# Patient Record
Sex: FEMALE | Race: BLACK OR AFRICAN AMERICAN | NOT HISPANIC OR LATINO | URBAN - METROPOLITAN AREA
[De-identification: names, ages, dates, MRNs, and addresses within clinical notes are randomized per-mention and may not be internally consistent; named-entity substitution may affect disease eponyms.]

---

## 2020-01-10 LAB — AMB EXT COLOGUARD RESULT: NEGATIVE

## 2020-07-02 ENCOUNTER — APPOINTMENT (OUTPATIENT)
Dept: URGENT CARE | Age: 85
End: 2020-07-02

## 2020-07-02 ENCOUNTER — TELEPHONE (OUTPATIENT)
Dept: SCHEDULING | Age: 85
End: 2020-07-02

## 2020-07-30 ENCOUNTER — OFFICE VISIT (OUTPATIENT)
Dept: FAMILY MEDICINE CLINIC | Facility: CLINIC | Age: 85
End: 2020-07-30
Payer: MEDICARE

## 2020-07-30 VITALS
SYSTOLIC BLOOD PRESSURE: 140 MMHG | WEIGHT: 120 LBS | HEIGHT: 65 IN | TEMPERATURE: 97 F | HEART RATE: 62 BPM | DIASTOLIC BLOOD PRESSURE: 71 MMHG | BODY MASS INDEX: 19.99 KG/M2

## 2020-07-30 DIAGNOSIS — E78.00 HYPERCHOLESTEREMIA: ICD-10-CM

## 2020-07-30 DIAGNOSIS — I10 ESSENTIAL HYPERTENSION: Primary | ICD-10-CM

## 2020-07-30 DIAGNOSIS — D64.9 ANEMIA, UNSPECIFIED TYPE: ICD-10-CM

## 2020-07-30 DIAGNOSIS — I73.9 PVD (PERIPHERAL VASCULAR DISEASE) (HCC): ICD-10-CM

## 2020-07-30 DIAGNOSIS — C69.91 MALIGNANT NEOPLASM OF RIGHT EYE (HCC): ICD-10-CM

## 2020-07-30 PROCEDURE — 3078F DIAST BP <80 MM HG: CPT | Performed by: FAMILY MEDICINE

## 2020-07-30 PROCEDURE — 3077F SYST BP >= 140 MM HG: CPT | Performed by: FAMILY MEDICINE

## 2020-07-30 PROCEDURE — 99203 OFFICE O/P NEW LOW 30 MIN: CPT | Performed by: FAMILY MEDICINE

## 2020-07-30 PROCEDURE — 3008F BODY MASS INDEX DOCD: CPT | Performed by: FAMILY MEDICINE

## 2020-07-30 RX ORDER — ROSUVASTATIN CALCIUM 10 MG/1
10 TABLET, COATED ORAL NIGHTLY
Qty: 90 TABLET | Refills: 3 | Status: SHIPPED | OUTPATIENT
Start: 2020-07-30 | End: 2020-11-12

## 2020-07-30 RX ORDER — ROSUVASTATIN CALCIUM 10 MG/1
10 TABLET, COATED ORAL NIGHTLY
COMMUNITY
Start: 2020-07-23 | End: 2020-07-30

## 2020-07-30 RX ORDER — ASPIRIN 81 MG/1
81 TABLET ORAL DAILY
Qty: 90 TABLET | Refills: 3 | Status: SHIPPED | OUTPATIENT
Start: 2020-07-30 | End: 2021-10-28

## 2020-07-30 RX ORDER — ASPIRIN 81 MG/1
1 TABLET ORAL
COMMUNITY
End: 2020-07-30

## 2020-07-30 RX ORDER — CLOPIDOGREL BISULFATE 75 MG/1
75 TABLET ORAL DAILY
Qty: 90 TABLET | Refills: 3 | Status: SHIPPED | OUTPATIENT
Start: 2020-07-30 | End: 2021-07-30

## 2020-07-30 RX ORDER — CARVEDILOL 6.25 MG/1
TABLET ORAL 2 TIMES DAILY
COMMUNITY
Start: 2020-07-23 | End: 2020-07-30

## 2020-07-30 RX ORDER — CLOPIDOGREL BISULFATE 75 MG/1
75 TABLET ORAL DAILY
COMMUNITY
Start: 2020-05-11 | End: 2020-07-30

## 2020-07-30 RX ORDER — IRBESARTAN 300 MG/1
300 TABLET ORAL DAILY
Qty: 90 TABLET | Refills: 3 | Status: SHIPPED | OUTPATIENT
Start: 2020-07-30 | End: 2021-07-30

## 2020-07-30 RX ORDER — HYDRALAZINE HYDROCHLORIDE 100 MG/1
TABLET, FILM COATED ORAL
COMMUNITY
Start: 2020-05-09 | End: 2020-07-30

## 2020-07-30 RX ORDER — IRBESARTAN 300 MG/1
300 TABLET ORAL DAILY
COMMUNITY
Start: 2020-05-04 | End: 2020-07-30

## 2020-07-30 RX ORDER — HYDROCHLOROTHIAZIDE 12.5 MG/1
12.5 TABLET ORAL DAILY
Qty: 90 TABLET | Refills: 3 | Status: SHIPPED | OUTPATIENT
Start: 2020-07-30 | End: 2021-07-30

## 2020-07-30 RX ORDER — HYDRALAZINE HYDROCHLORIDE 100 MG/1
TABLET, FILM COATED ORAL
Qty: 180 TABLET | Refills: 3 | Status: SHIPPED | OUTPATIENT
Start: 2020-07-30 | End: 2021-07-30

## 2020-07-30 RX ORDER — CARVEDILOL 6.25 MG/1
6.25 TABLET ORAL 2 TIMES DAILY
Qty: 180 TABLET | Refills: 3 | Status: SHIPPED | OUTPATIENT
Start: 2020-07-30 | End: 2021-04-29 | Stop reason: DRUGHIGH

## 2020-07-30 RX ORDER — FIBER
TABLET ORAL
COMMUNITY

## 2020-07-30 NOTE — PROGRESS NOTES
Rob Roper is a 80year old female. Patient presents with:  Establish Care  Medication Request      HPI:   Patient presents as an 63-year-old female complaining of bilateral leg swelling.   Patient has a past medical history of hypertension and periphe sweats, fatigue  VISION: No recent vision problems, blurry vision or double vision  HEENT: No decreased hearing ear pain nasal congestion or sore throat  SKIN: denies any unusual skin lesions or rashes  RESPIRATORY: denies shortness of breath, cough, wheez

## 2020-08-06 ENCOUNTER — OFFICE VISIT (OUTPATIENT)
Dept: FAMILY MEDICINE CLINIC | Facility: CLINIC | Age: 85
End: 2020-08-06
Payer: MEDICARE

## 2020-08-06 VITALS
TEMPERATURE: 97 F | HEIGHT: 65 IN | SYSTOLIC BLOOD PRESSURE: 130 MMHG | BODY MASS INDEX: 19.79 KG/M2 | DIASTOLIC BLOOD PRESSURE: 65 MMHG | WEIGHT: 118.81 LBS | HEART RATE: 62 BPM

## 2020-08-06 DIAGNOSIS — R60.0 BILATERAL LEG EDEMA: ICD-10-CM

## 2020-08-06 DIAGNOSIS — I10 ESSENTIAL HYPERTENSION: Primary | ICD-10-CM

## 2020-08-06 PROCEDURE — 3078F DIAST BP <80 MM HG: CPT | Performed by: FAMILY MEDICINE

## 2020-08-06 PROCEDURE — 99213 OFFICE O/P EST LOW 20 MIN: CPT | Performed by: FAMILY MEDICINE

## 2020-08-06 PROCEDURE — 3075F SYST BP GE 130 - 139MM HG: CPT | Performed by: FAMILY MEDICINE

## 2020-08-06 PROCEDURE — 3008F BODY MASS INDEX DOCD: CPT | Performed by: FAMILY MEDICINE

## 2020-08-06 NOTE — PROGRESS NOTES
Juana Corcoran is a 80year old female. Patient presents with:  Edema: f/u on swelling of bilateral ankles      HPI:   Pt presents for follow up taking HCTZ 12.5 mg every other day. Leg swelling is greatly improved.  Itching also improved    Current Outpa denies heartburn, nausea or vomiting  : No Pain on urination, change in the color of urine, discharge, urinating frequently  MUS: No back pain, joint pain, muscle pain  NEURO: denies headaches , anxiety, depression    EXAM:   /65   Pulse 62   Temp

## 2020-09-02 ENCOUNTER — OFFICE VISIT (OUTPATIENT)
Dept: FAMILY MEDICINE CLINIC | Facility: CLINIC | Age: 85
End: 2020-09-02
Payer: MEDICARE

## 2020-09-02 VITALS
WEIGHT: 116.63 LBS | SYSTOLIC BLOOD PRESSURE: 128 MMHG | BODY MASS INDEX: 19.43 KG/M2 | HEIGHT: 65 IN | TEMPERATURE: 98 F | HEART RATE: 63 BPM | DIASTOLIC BLOOD PRESSURE: 71 MMHG

## 2020-09-02 DIAGNOSIS — R41.3 MEMORY LOSS: ICD-10-CM

## 2020-09-02 DIAGNOSIS — I10 ESSENTIAL HYPERTENSION: Primary | ICD-10-CM

## 2020-09-02 PROCEDURE — 3074F SYST BP LT 130 MM HG: CPT | Performed by: FAMILY MEDICINE

## 2020-09-02 PROCEDURE — 99213 OFFICE O/P EST LOW 20 MIN: CPT | Performed by: FAMILY MEDICINE

## 2020-09-02 PROCEDURE — 3008F BODY MASS INDEX DOCD: CPT | Performed by: FAMILY MEDICINE

## 2020-09-02 PROCEDURE — 3078F DIAST BP <80 MM HG: CPT | Performed by: FAMILY MEDICINE

## 2020-09-02 NOTE — PROGRESS NOTES
HPI:  Alexy Odell is a 80year old female who presents for establishment of care. Used to see Dr. Lyn Sanders. Daughter is Mic Hodges. Saw Dr. Munira Mustafa for prescription refill. Had restarted diuretic due to ankle edema. Taking it every other day.      Blind in 3  hydrochlorothiazide 12.5 MG Oral Tab, Take 1 tablet (12.5 mg total) by mouth daily. (Patient taking differently: Take 12.5 mg by mouth every other day.  ), Disp: 90 tablet, Rfl: 3    No current facility-administered medications on file prior to visit.

## 2020-10-09 ENCOUNTER — LAB ENCOUNTER (OUTPATIENT)
Dept: LAB | Facility: HOSPITAL | Age: 85
End: 2020-10-09
Attending: Other
Payer: MEDICARE

## 2020-10-09 DIAGNOSIS — G30.1 LATE ONSET ALZHEIMER'S DISEASE WITHOUT BEHAVIORAL DISTURBANCE (HCC): ICD-10-CM

## 2020-10-09 DIAGNOSIS — F02.80 LATE ONSET ALZHEIMER'S DISEASE WITHOUT BEHAVIORAL DISTURBANCE (HCC): ICD-10-CM

## 2020-10-09 PROCEDURE — 36415 COLL VENOUS BLD VENIPUNCTURE: CPT

## 2020-10-09 PROCEDURE — 86780 TREPONEMA PALLIDUM: CPT

## 2020-10-09 PROCEDURE — 84443 ASSAY THYROID STIM HORMONE: CPT

## 2020-10-09 PROCEDURE — 82607 VITAMIN B-12: CPT

## 2020-10-09 PROCEDURE — 86592 SYPHILIS TEST NON-TREP QUAL: CPT

## 2020-10-09 PROCEDURE — 86593 SYPHILIS TEST NON-TREP QUANT: CPT

## 2020-10-09 NOTE — PROGRESS NOTES
Neurology Initial Visit     Referred By: Dr. Jose Duarte    Chief Complaint: Patient presents with:  Memory Loss: New patient referred by Dr Jose Duarte since Covid pandemic started.  Accompanied by daughter  who states she will become confused, repeats same Rashid An mg total) by mouth daily. , Disp: 90 tablet, Rfl: 3  •  carvedilol 6.25 MG Oral Tab, Take 1 tablet (6.25 mg total) by mouth 2 (two) times daily. , Disp: 180 tablet, Rfl: 3  •  Clopidogrel Bisulfate 75 MG Oral Tab, Take 1 tablet (75 mg total) by mouth daily. , distally    Sensory Exam:  Light touch sensation- intact in all 4 extremities    Deep Tendon Reflexes:  Biceps 2+ bilateral symmetric  Triceps 2+ bilateral symmetric  Brachioradialis 2 + bilateral symmetric  Patellar 2+ bilateral symmetric  Ankle jerk 2+ b

## 2020-10-14 ENCOUNTER — MED REC SCAN ONLY (OUTPATIENT)
Dept: NEUROLOGY | Facility: CLINIC | Age: 85
End: 2020-10-14

## 2020-10-19 ENCOUNTER — TELEPHONE (OUTPATIENT)
Dept: NEUROLOGY | Facility: CLINIC | Age: 85
End: 2020-10-19

## 2020-10-19 NOTE — TELEPHONE ENCOUNTER
Positive RPR result. Called patient`s cell phone and daughter answered. Spoke to patient`s daughter Thomas Miller verified. Advised RPR was positive and should follow up with infectious disease for evaluation. Daughter expressed understanding.      Dana

## 2020-10-19 NOTE — TELEPHONE ENCOUNTER
----- Message from Humberto Santos MD sent at 10/19/2020  8:18 AM CDT -----  Forward her to the infectious diseases for further evaluation    Thank you

## 2020-12-02 ENCOUNTER — OFFICE VISIT (OUTPATIENT)
Dept: FAMILY MEDICINE CLINIC | Facility: CLINIC | Age: 85
End: 2020-12-02
Payer: MEDICARE

## 2020-12-02 VITALS
BODY MASS INDEX: 18.99 KG/M2 | DIASTOLIC BLOOD PRESSURE: 79 MMHG | TEMPERATURE: 97 F | HEART RATE: 65 BPM | HEIGHT: 65 IN | WEIGHT: 114 LBS | SYSTOLIC BLOOD PRESSURE: 167 MMHG | RESPIRATION RATE: 18 BRPM

## 2020-12-02 DIAGNOSIS — E78.00 HYPERCHOLESTEREMIA: ICD-10-CM

## 2020-12-02 DIAGNOSIS — G30.9 ALZHEIMER'S DEMENTIA WITHOUT BEHAVIORAL DISTURBANCE, UNSPECIFIED TIMING OF DEMENTIA ONSET (HCC): ICD-10-CM

## 2020-12-02 DIAGNOSIS — C69.91 MALIGNANT NEOPLASM OF RIGHT EYE (HCC): ICD-10-CM

## 2020-12-02 DIAGNOSIS — F02.80 ALZHEIMER'S DEMENTIA WITHOUT BEHAVIORAL DISTURBANCE, UNSPECIFIED TIMING OF DEMENTIA ONSET (HCC): ICD-10-CM

## 2020-12-02 DIAGNOSIS — I10 ESSENTIAL HYPERTENSION: ICD-10-CM

## 2020-12-02 DIAGNOSIS — J34.89 RHINORRHEA: Primary | ICD-10-CM

## 2020-12-02 DIAGNOSIS — Z00.00 ENCOUNTER FOR ANNUAL PHYSICAL EXAM: ICD-10-CM

## 2020-12-02 DIAGNOSIS — Z00.00 ENCOUNTER FOR ANNUAL HEALTH EXAMINATION: ICD-10-CM

## 2020-12-02 PROCEDURE — 3077F SYST BP >= 140 MM HG: CPT | Performed by: FAMILY MEDICINE

## 2020-12-02 PROCEDURE — 90662 IIV NO PRSV INCREASED AG IM: CPT | Performed by: FAMILY MEDICINE

## 2020-12-02 PROCEDURE — 96160 PT-FOCUSED HLTH RISK ASSMT: CPT | Performed by: FAMILY MEDICINE

## 2020-12-02 PROCEDURE — 99397 PER PM REEVAL EST PAT 65+ YR: CPT | Performed by: FAMILY MEDICINE

## 2020-12-02 PROCEDURE — G0008 ADMIN INFLUENZA VIRUS VAC: HCPCS | Performed by: FAMILY MEDICINE

## 2020-12-02 PROCEDURE — 3008F BODY MASS INDEX DOCD: CPT | Performed by: FAMILY MEDICINE

## 2020-12-02 PROCEDURE — 3078F DIAST BP <80 MM HG: CPT | Performed by: FAMILY MEDICINE

## 2020-12-02 PROCEDURE — G0439 PPPS, SUBSEQ VISIT: HCPCS | Performed by: FAMILY MEDICINE

## 2020-12-02 PROCEDURE — 36415 COLL VENOUS BLD VENIPUNCTURE: CPT | Performed by: FAMILY MEDICINE

## 2020-12-02 RX ORDER — ROSUVASTATIN CALCIUM 10 MG/1
1 TABLET, COATED ORAL
COMMUNITY
End: 2021-12-27

## 2020-12-02 RX ORDER — DIAPER,BRIEF,INFANT-TODD,DISP
EACH MISCELLANEOUS
COMMUNITY
Start: 2020-11-23 | End: 2021-11-17

## 2020-12-02 RX ORDER — LORATADINE 10 MG/1
10 TABLET ORAL DAILY
Qty: 90 TABLET | Refills: 1 | Status: SHIPPED | OUTPATIENT
Start: 2020-12-02 | End: 2021-06-01

## 2020-12-02 NOTE — PROGRESS NOTES
HPI:   Rob Roper is a 80year old female who presents for a Medicare Subsequent Annual Wellness visit (Pt already had Initial Annual Wellness). 80 yr old female who present for Medicare physical. Lives with daughter.   Has trouble performing Scorin       Cognitive Assessment     What day of the week is this?: Correct  What month is it?: Correct  What year is it?: Correct  Recall \"Ball\": Correct  Recall \"Flag\": Correct  Recall \"Tree\": Correct       Functional Ability/Status   Deloria Barwick Family/surrogate (if present), and forms available to patient in AVS     She does NOT have a Power of  for [employer] on file in [place].    Advance care planning including the explanation and discussion of advance directives standard forms performed Vitamins-Minerals (MULTI-DAY PLUS MINERALS) Oral Tab, Take by mouth. •  aspirin (ASPIRIN 81) 81 MG Oral Tab EC, Take 1 tablet (81 mg total) by mouth daily.  (Patient taking differently: Take 81 mg by mouth every other day.  )    •  carvedilol 6.25 MG Ora and rash  Musculoskeletal:  Negative for bone/joint symptoms  Neurological:  Positive for mental status changes- chronic  Psychiatric:  Negative for inappropriate interaction and psychiatric symptoms      EXAM:   BP (!) 167/79   Pulse 65   Temp 96.8 °F (36 presents for a Medicare Assessment. PLAN SUMMARY:     Encounter for annual physical exam  -     CBC, PLATELET; NO DIFFERENTIAL; Future  -     COMP METABOLIC PANEL (14); Future  -     LIPID PANEL; Future  -Flu shot given.      Rhinorrhea  -     ENT - INT necessary Electrocardiogram date       Colorectal Cancer Screening      Colonoscopy Screen every 10 years There are no preventive care reminders to display for this patient.  Update Health Maintenance if applicable    Flex Sigmoidoscopy Screen every 10 year Part B No vaccine history found This may be covered with your prescription benefits, but Medicare does not cover unless Medically needed    Zoster  Not covered by Medicare Part B No vaccine history found This may be covered with your pharmacy  prescription

## 2020-12-11 ENCOUNTER — OFFICE VISIT (OUTPATIENT)
Dept: OTOLARYNGOLOGY | Facility: CLINIC | Age: 85
End: 2020-12-11
Payer: MEDICARE

## 2020-12-11 VITALS
HEIGHT: 67 IN | TEMPERATURE: 97 F | WEIGHT: 114 LBS | BODY MASS INDEX: 17.89 KG/M2 | DIASTOLIC BLOOD PRESSURE: 68 MMHG | SYSTOLIC BLOOD PRESSURE: 124 MMHG

## 2020-12-11 DIAGNOSIS — R09.82 POSTNASAL DISCHARGE: Primary | ICD-10-CM

## 2020-12-11 PROCEDURE — 3008F BODY MASS INDEX DOCD: CPT | Performed by: OTOLARYNGOLOGY

## 2020-12-11 PROCEDURE — 3078F DIAST BP <80 MM HG: CPT | Performed by: OTOLARYNGOLOGY

## 2020-12-11 PROCEDURE — 99203 OFFICE O/P NEW LOW 30 MIN: CPT | Performed by: OTOLARYNGOLOGY

## 2020-12-11 PROCEDURE — 3074F SYST BP LT 130 MM HG: CPT | Performed by: OTOLARYNGOLOGY

## 2020-12-11 RX ORDER — MONTELUKAST SODIUM 10 MG/1
10 TABLET ORAL NIGHTLY
Qty: 30 TABLET | Refills: 3 | Status: SHIPPED | OUTPATIENT
Start: 2020-12-11 | End: 2021-03-02

## 2020-12-11 RX ORDER — FLUTICASONE PROPIONATE 50 MCG
1 SPRAY, SUSPENSION (ML) NASAL 2 TIMES DAILY
Qty: 1 BOTTLE | Refills: 3 | Status: SHIPPED | OUTPATIENT
Start: 2020-12-11 | End: 2021-03-02

## 2020-12-11 NOTE — PROGRESS NOTES
Osmani Cronin is a 80year old female.   Patient presents with:  Nose Bleed: Patient Presents with constant runny nose for a long while       HISTORY OF PRESENT ILLNESS    She presents with [place] Redo 1 to 2-year history of runny nose with postnasal discharge an bleeding and easy bruising.            PHYSICAL EXAM    /68 (BP Location: Left arm, Patient Position: Sitting, Cuff Size: adult)   Temp 97 °F (36.1 °C) (Tympanic)   Ht 5' 7\" (1.702 m)   Wt 114 lb (51.7 kg)   BMI 17.85 kg/m²        Constitutional Norm •  loratadine (CLARITIN) 10 MG Oral Tab, Take 1 tablet (10 mg total) by mouth daily. , Disp: 90 tablet, Rfl: 1  •  Donepezil HCl 5 MG Oral Tab, Take 1 tablet (5 mg total) by mouth nightly., Disp: 90 tablet, Rfl: 3  •  Multiple Vitamins-Minerals (MULTI-DAY

## 2020-12-13 PROBLEM — R60.0 BILATERAL LEG EDEMA: Status: RESOLVED | Noted: 2020-08-06 | Resolved: 2020-12-13

## 2021-01-15 ENCOUNTER — OFFICE VISIT (OUTPATIENT)
Dept: OTOLARYNGOLOGY | Facility: CLINIC | Age: 86
End: 2021-01-15
Payer: MEDICARE

## 2021-01-15 VITALS
BODY MASS INDEX: 18 KG/M2 | TEMPERATURE: 96 F | SYSTOLIC BLOOD PRESSURE: 178 MMHG | DIASTOLIC BLOOD PRESSURE: 82 MMHG | HEART RATE: 65 BPM | WEIGHT: 114 LBS

## 2021-01-15 DIAGNOSIS — R09.82 POSTNASAL DISCHARGE: Primary | ICD-10-CM

## 2021-01-15 PROCEDURE — 3079F DIAST BP 80-89 MM HG: CPT | Performed by: OTOLARYNGOLOGY

## 2021-01-15 PROCEDURE — 3077F SYST BP >= 140 MM HG: CPT | Performed by: OTOLARYNGOLOGY

## 2021-01-15 PROCEDURE — 99213 OFFICE O/P EST LOW 20 MIN: CPT | Performed by: OTOLARYNGOLOGY

## 2021-01-15 RX ORDER — GLYCOPYRROLATE 1 MG/1
1 TABLET ORAL 2 TIMES DAILY
Qty: 60 TABLET | Refills: 1 | Status: SHIPPED | OUTPATIENT
Start: 2021-01-15 | End: 2021-04-02

## 2021-01-15 NOTE — PROGRESS NOTES
Floyd Lindsay is a 80year old female. Patient presents with: Follow - Up: One month follow up. Patient continues to have runny nose despite try flonase and singulair.        HISTORY OF PRESENT ILLNESS  She presents with Sandra Anderson 1 to 2-year history of runny Blurred vision and vision changes. Respiratory Negative Dyspnea and wheezing. Cardio Negative Chest pain, irregular heartbeat/palpitations and syncope. GI Negative Abdominal pain and diarrhea.    Endocrine Negative Cold intolerance and heat intoleranc (two) times daily. , Disp: 60 tablet, Rfl: 1  •  Fluticasone Propionate 50 MCG/ACT Nasal Suspension, 1 spray by Nasal route 2 (two) times daily. , Disp: 1 Bottle, Rfl: 3  •  Bacitracin Zinc 500 UNIT/GM External Ointment, APPBID TO LEGS FOR 1 MONTH, Disp: , R voice recognition dictation software. As a result errors may occur. When identified these errors have been corrected. While every attempt is made to correct errors during dictation discrepancies may still exist    Dread Quinones MD    1/15/2021    1:08 PM

## 2021-02-01 DIAGNOSIS — Z23 NEED FOR VACCINATION: ICD-10-CM

## 2021-02-19 ENCOUNTER — OFFICE VISIT (OUTPATIENT)
Dept: OTOLARYNGOLOGY | Facility: CLINIC | Age: 86
End: 2021-02-19
Payer: MEDICARE

## 2021-02-19 VITALS
BODY MASS INDEX: 17.89 KG/M2 | WEIGHT: 114 LBS | SYSTOLIC BLOOD PRESSURE: 131 MMHG | HEIGHT: 67 IN | DIASTOLIC BLOOD PRESSURE: 68 MMHG | TEMPERATURE: 97 F

## 2021-02-19 DIAGNOSIS — R09.82 POSTNASAL DISCHARGE: Primary | ICD-10-CM

## 2021-02-19 PROCEDURE — 3075F SYST BP GE 130 - 139MM HG: CPT | Performed by: OTOLARYNGOLOGY

## 2021-02-19 PROCEDURE — 3078F DIAST BP <80 MM HG: CPT | Performed by: OTOLARYNGOLOGY

## 2021-02-19 PROCEDURE — 99213 OFFICE O/P EST LOW 20 MIN: CPT | Performed by: OTOLARYNGOLOGY

## 2021-02-19 PROCEDURE — 3008F BODY MASS INDEX DOCD: CPT | Performed by: OTOLARYNGOLOGY

## 2021-02-19 NOTE — PROGRESS NOTES
Alexandru Vázquez is a 80year old female. Patient presents with:   Follow - Up: Patient here for 1 month f/u regarding Postnasal discharge, per pt pist nasal discharge is better       HISTORY OF PRESENT ILLNESS  She presents with Brisa Lo 1 to 2-year history of eye   • Cancer of eye [facility])    • Essential hypertension    • Hyperlipidemia        Past Surgical History:   Procedure Laterality Date   • CATARACT Left    • EYE SURGERY     • HYSTERECTOMY     • OTHER SURGICAL HISTORY  1960    partial thyroidectomy   • THYR Lymph Detail Normal Submental. Submandibular. Anterior cervical. Posterior cervical. Supraclavicular.         Nose/Mouth/Throat Normal External nose - Normal. Lips/teeth/gums - Normal. Tonsils - Normal. Oropharynx - Normal.   Nose/Mouth/Throat Normal mouth daily. (Patient taking differently: Take 12.5 mg by mouth every other day.  ), Disp: 90 tablet, Rfl: 3  ASSESSMENT AND PLAN    1. Postnasal discharge  Doing very well on Singulair loratadine fluticasone and glycopyrrolate 3 times daily.   Using the sp

## 2021-03-02 RX ORDER — FLUTICASONE PROPIONATE 50 MCG
SPRAY, SUSPENSION (ML) NASAL
Qty: 16 G | Refills: 2 | Status: SHIPPED | OUTPATIENT
Start: 2021-03-02 | End: 2021-11-17

## 2021-03-02 RX ORDER — MONTELUKAST SODIUM 10 MG/1
TABLET ORAL
Qty: 90 TABLET | Refills: 0 | Status: SHIPPED | OUTPATIENT
Start: 2021-03-02 | End: 2021-03-29

## 2021-03-06 ENCOUNTER — IMMUNIZATION (OUTPATIENT)
Dept: LAB | Facility: HOSPITAL | Age: 86
End: 2021-03-06
Attending: HOSPITALIST
Payer: MEDICARE

## 2021-03-06 DIAGNOSIS — Z23 NEED FOR VACCINATION: Primary | ICD-10-CM

## 2021-03-06 PROCEDURE — 0011A SARSCOV2 VAC 100MCG/0.5ML IM: CPT

## 2021-03-29 RX ORDER — MONTELUKAST SODIUM 10 MG/1
TABLET ORAL
Qty: 90 TABLET | Refills: 0 | Status: SHIPPED | OUTPATIENT
Start: 2021-03-29 | End: 2021-06-15

## 2021-04-02 RX ORDER — GLYCOPYRROLATE 1 MG/1
TABLET ORAL
Qty: 60 TABLET | Refills: 1 | Status: SHIPPED | OUTPATIENT
Start: 2021-04-02 | End: 2021-11-17

## 2021-04-03 ENCOUNTER — IMMUNIZATION (OUTPATIENT)
Dept: LAB | Facility: HOSPITAL | Age: 86
End: 2021-04-03
Attending: EMERGENCY MEDICINE
Payer: MEDICARE

## 2021-04-03 DIAGNOSIS — Z23 NEED FOR VACCINATION: Primary | ICD-10-CM

## 2021-04-03 PROCEDURE — 0012A SARSCOV2 VAC 100MCG/0.5ML IM: CPT

## 2021-04-26 ENCOUNTER — OFFICE VISIT (OUTPATIENT)
Dept: OTOLARYNGOLOGY | Facility: CLINIC | Age: 86
End: 2021-04-26
Payer: MEDICARE

## 2021-04-26 ENCOUNTER — OFFICE VISIT (OUTPATIENT)
Dept: FAMILY MEDICINE CLINIC | Facility: CLINIC | Age: 86
End: 2021-04-26
Payer: MEDICARE

## 2021-04-26 VITALS
TEMPERATURE: 97 F | SYSTOLIC BLOOD PRESSURE: 220 MMHG | HEIGHT: 67 IN | BODY MASS INDEX: 17.89 KG/M2 | DIASTOLIC BLOOD PRESSURE: 88 MMHG | WEIGHT: 114 LBS

## 2021-04-26 DIAGNOSIS — I10 UNCONTROLLED HYPERTENSION: Primary | ICD-10-CM

## 2021-04-26 DIAGNOSIS — R09.82 POSTNASAL DISCHARGE: Primary | ICD-10-CM

## 2021-04-26 PROCEDURE — 3008F BODY MASS INDEX DOCD: CPT | Performed by: FAMILY MEDICINE

## 2021-04-26 PROCEDURE — 99214 OFFICE O/P EST MOD 30 MIN: CPT | Performed by: FAMILY MEDICINE

## 2021-04-26 PROCEDURE — 3077F SYST BP >= 140 MM HG: CPT | Performed by: FAMILY MEDICINE

## 2021-04-26 PROCEDURE — 3079F DIAST BP 80-89 MM HG: CPT | Performed by: OTOLARYNGOLOGY

## 2021-04-26 PROCEDURE — 3008F BODY MASS INDEX DOCD: CPT | Performed by: OTOLARYNGOLOGY

## 2021-04-26 PROCEDURE — 3079F DIAST BP 80-89 MM HG: CPT | Performed by: FAMILY MEDICINE

## 2021-04-26 PROCEDURE — 93000 ELECTROCARDIOGRAM COMPLETE: CPT | Performed by: FAMILY MEDICINE

## 2021-04-26 PROCEDURE — 3077F SYST BP >= 140 MM HG: CPT | Performed by: OTOLARYNGOLOGY

## 2021-04-26 PROCEDURE — 99213 OFFICE O/P EST LOW 20 MIN: CPT | Performed by: OTOLARYNGOLOGY

## 2021-04-26 NOTE — PROGRESS NOTES
Erlinda Sánchez is a 80year old female. Patient presents with:   Follow - Up: Patient here for a follow up regaridng postnasal discharge, per pt feeling better with medications       HISTORY OF PRESENT ILLNESS  She presents with Ronal Cavanaugh 1 to 2-year history of Tobacco Use      Smoking status: Never Smoker      Smokeless tobacco: Never Used    Vaping Use      Vaping Use: Never used    Substance and Sexual Activity      Alcohol use: Never      Drug use: Never      History reviewed. No pertinent family history. features - Normal. Eyebrows - Normal. Skull - Normal.        Nasopharynx Normal External nose - Normal. Lips/teeth/gums - Normal. Tonsils - Normal. Oropharynx - Normal.   Ears Normal Inspection - Right: Normal, Left: Normal. Canal - Right: Normal, Left: No Rfl: 3  •  hydrALAZINE HCl 100 MG Oral Tab, TAKE ONE T PO BID WITH FOOD, Disp: 180 tablet, Rfl: 3  •  Irbesartan 300 MG Oral Tab, Take 1 tablet (300 mg total) by mouth daily. , Disp: 90 tablet, Rfl: 3  •  hydrochlorothiazide 12.5 MG Oral Tab, Take 1 tablet

## 2021-04-27 VITALS
TEMPERATURE: 97 F | HEART RATE: 69 BPM | SYSTOLIC BLOOD PRESSURE: 188 MMHG | DIASTOLIC BLOOD PRESSURE: 84 MMHG | HEIGHT: 67 IN | WEIGHT: 114 LBS | BODY MASS INDEX: 17.89 KG/M2

## 2021-04-27 NOTE — PROGRESS NOTES
HPI:    Betty Sagsatume is a 80year old female presents to clinic with daughter for follow-up regarding hypertension. Was seen by Dr. Rolo Stahl today-blood pressure was elevated. Reports compliance with medications. Daughter gives her her meds daily.   Do (Patient taking differently: Take 81 mg by mouth every other day.  ) 90 tablet 3   • carvedilol 6.25 MG Oral Tab Take 1 tablet (6.25 mg total) by mouth 2 (two) times daily.  180 tablet 3   • Clopidogrel Bisulfate 75 MG Oral Tab Take 1 tablet (75 mg total) b aware that if she experiences any chest pain, shortness of breath, headaches, dizziness, to proceed to the ER. Responsible party/patient verbalized understanding of information discussed. No barriers to learning observed.           Orders This Visit:

## 2021-04-29 ENCOUNTER — LAB ENCOUNTER (OUTPATIENT)
Dept: LAB | Age: 86
End: 2021-04-29
Attending: FAMILY MEDICINE
Payer: MEDICARE

## 2021-04-29 ENCOUNTER — OFFICE VISIT (OUTPATIENT)
Dept: FAMILY MEDICINE CLINIC | Facility: CLINIC | Age: 86
End: 2021-04-29
Payer: MEDICARE

## 2021-04-29 VITALS
DIASTOLIC BLOOD PRESSURE: 65 MMHG | HEART RATE: 69 BPM | WEIGHT: 120 LBS | SYSTOLIC BLOOD PRESSURE: 220 MMHG | BODY MASS INDEX: 19 KG/M2 | RESPIRATION RATE: 18 BRPM | TEMPERATURE: 96 F

## 2021-04-29 DIAGNOSIS — N18.32 STAGE 3B CHRONIC KIDNEY DISEASE (HCC): Primary | ICD-10-CM

## 2021-04-29 DIAGNOSIS — I10 ESSENTIAL HYPERTENSION: ICD-10-CM

## 2021-04-29 DIAGNOSIS — N18.32 STAGE 3B CHRONIC KIDNEY DISEASE (HCC): ICD-10-CM

## 2021-04-29 PROCEDURE — 99214 OFFICE O/P EST MOD 30 MIN: CPT | Performed by: FAMILY MEDICINE

## 2021-04-29 PROCEDURE — 3078F DIAST BP <80 MM HG: CPT | Performed by: FAMILY MEDICINE

## 2021-04-29 PROCEDURE — 80053 COMPREHEN METABOLIC PANEL: CPT

## 2021-04-29 PROCEDURE — 3077F SYST BP >= 140 MM HG: CPT | Performed by: FAMILY MEDICINE

## 2021-04-29 PROCEDURE — 85027 COMPLETE CBC AUTOMATED: CPT

## 2021-04-29 PROCEDURE — 36415 COLL VENOUS BLD VENIPUNCTURE: CPT

## 2021-04-29 RX ORDER — CARVEDILOL 12.5 MG/1
12.5 TABLET ORAL 2 TIMES DAILY WITH MEALS
Qty: 180 TABLET | Refills: 1 | Status: SHIPPED | OUTPATIENT
Start: 2021-04-29 | End: 2021-10-20

## 2021-04-29 NOTE — PROGRESS NOTES
HPI: Sheryle Mylar is a 80year old female who presents for HTN follow-up. BP has been very high. Home blood pressures have been 160s-170s/60s. Nervous because she is here.  Saw Dr. Cheri Garcia on Tuesday who did EKG which was normal and then increased Carvedilol to Oral Tab, Take 1 tablet (300 mg total) by mouth daily. , Disp: 90 tablet, Rfl: 3  hydrochlorothiazide 12.5 MG Oral Tab, Take 1 tablet (12.5 mg total) by mouth daily.  (Patient taking differently: Take 12.5 mg by mouth every other day.  ), Disp: 90 tablet, Rf

## 2021-06-01 RX ORDER — LORATADINE 10 MG/1
TABLET ORAL
Qty: 90 TABLET | Refills: 1 | Status: SHIPPED | OUTPATIENT
Start: 2021-06-01 | End: 2021-11-29

## 2021-06-05 ENCOUNTER — OFFICE VISIT (OUTPATIENT)
Dept: FAMILY MEDICINE CLINIC | Facility: CLINIC | Age: 86
End: 2021-06-05
Payer: MEDICARE

## 2021-06-05 VITALS
SYSTOLIC BLOOD PRESSURE: 126 MMHG | HEIGHT: 67 IN | TEMPERATURE: 98 F | BODY MASS INDEX: 19 KG/M2 | RESPIRATION RATE: 16 BRPM | HEART RATE: 59 BPM | DIASTOLIC BLOOD PRESSURE: 61 MMHG

## 2021-06-05 DIAGNOSIS — I10 ESSENTIAL HYPERTENSION: Primary | ICD-10-CM

## 2021-06-05 DIAGNOSIS — R40.4 ALTERED LEVEL OF CONSCIOUSNESS: ICD-10-CM

## 2021-06-05 PROCEDURE — 99214 OFFICE O/P EST MOD 30 MIN: CPT | Performed by: FAMILY MEDICINE

## 2021-06-05 PROCEDURE — 3078F DIAST BP <80 MM HG: CPT | Performed by: FAMILY MEDICINE

## 2021-06-05 PROCEDURE — 3074F SYST BP LT 130 MM HG: CPT | Performed by: FAMILY MEDICINE

## 2021-06-05 NOTE — PROGRESS NOTES
HPI:    Siddharth Candelario is a 80year old female presents to clinic for ER follow up. Patient was unresponsive at home on 6/3/21. Patient's daughter saw her breathing, but she is not responding to loud voices.   Took her to the [facility] work and head CTs we every other day.  ) 90 tablet 3   • Clopidogrel Bisulfate 75 MG Oral Tab Take 1 tablet (75 mg total) by mouth daily.  90 tablet 3   • hydrALAZINE HCl 100 MG Oral Tab TAKE ONE T PO BID WITH FOOD 180 tablet 3   • Irbesartan 300 MG Oral Tab Take 1 tablet (300 encounter. Meds This Visit:  Requested Prescriptions      No prescriptions requested or ordered in this encounter       Imaging & Referrals:  None       This note was created by [employer] voice recognition.  Errors in content may be related to improper re

## 2021-06-15 RX ORDER — MONTELUKAST SODIUM 10 MG/1
TABLET ORAL
Qty: 90 TABLET | Refills: 0 | Status: SHIPPED | OUTPATIENT
Start: 2021-06-15 | End: 2021-08-30

## 2021-07-30 RX ORDER — HYDRALAZINE HYDROCHLORIDE 100 MG/1
TABLET, FILM COATED ORAL
Qty: 180 TABLET | Refills: 3 | Status: SHIPPED | OUTPATIENT
Start: 2021-07-30

## 2021-07-30 RX ORDER — IRBESARTAN 300 MG/1
TABLET ORAL
Qty: 90 TABLET | Refills: 3 | Status: SHIPPED | OUTPATIENT
Start: 2021-07-30

## 2021-07-30 RX ORDER — CLOPIDOGREL BISULFATE 75 MG/1
TABLET ORAL
Qty: 90 TABLET | Refills: 3 | Status: SHIPPED | OUTPATIENT
Start: 2021-07-30

## 2021-07-30 RX ORDER — HYDROCHLOROTHIAZIDE 12.5 MG/1
12.5 TABLET ORAL DAILY
Qty: 90 TABLET | Refills: 3 | Status: SHIPPED | OUTPATIENT
Start: 2021-07-30

## 2021-08-30 RX ORDER — MONTELUKAST SODIUM 10 MG/1
TABLET ORAL
Qty: 90 TABLET | Refills: 0 | Status: SHIPPED | OUTPATIENT
Start: 2021-08-30 | End: 2021-12-03

## 2021-09-23 RX ORDER — DONEPEZIL HYDROCHLORIDE 5 MG/1
TABLET, FILM COATED ORAL
Qty: 90 TABLET | Refills: 3 | Status: SHIPPED | OUTPATIENT
Start: 2021-09-23

## 2021-10-20 RX ORDER — CARVEDILOL 12.5 MG/1
TABLET ORAL
Qty: 180 TABLET | Refills: 1 | Status: SHIPPED | OUTPATIENT
Start: 2021-10-20

## 2021-10-20 NOTE — TELEPHONE ENCOUNTER
Please review. Protocol failed / No protocol.     Requested Prescriptions   Pending Prescriptions Disp Refills    CARVEDILOL 12.5 MG Oral Tab [Pharmacy Med Name: CARVEDILOL 12.5MG TABLETS] 180 tablet 1     Sig: TAKE 1 TABLET(12.5 MG) BY MOUTH TWICE DAILY W

## 2021-10-25 ENCOUNTER — OFFICE VISIT (OUTPATIENT)
Dept: FAMILY MEDICINE CLINIC | Facility: CLINIC | Age: 86
End: 2021-10-25
Payer: MEDICARE

## 2021-10-25 VITALS
WEIGHT: 118.63 LBS | TEMPERATURE: 97 F | HEART RATE: 69 BPM | SYSTOLIC BLOOD PRESSURE: 163 MMHG | BODY MASS INDEX: 19 KG/M2 | RESPIRATION RATE: 18 BRPM | DIASTOLIC BLOOD PRESSURE: 62 MMHG

## 2021-10-25 DIAGNOSIS — J34.89 RHINORRHEA: ICD-10-CM

## 2021-10-25 DIAGNOSIS — I10 PRIMARY HYPERTENSION: Primary | ICD-10-CM

## 2021-10-25 DIAGNOSIS — R55 SYNCOPE, UNSPECIFIED SYNCOPE TYPE: ICD-10-CM

## 2021-10-25 PROCEDURE — G0008 ADMIN INFLUENZA VIRUS VAC: HCPCS | Performed by: FAMILY MEDICINE

## 2021-10-25 PROCEDURE — 99214 OFFICE O/P EST MOD 30 MIN: CPT | Performed by: FAMILY MEDICINE

## 2021-10-25 PROCEDURE — 3078F DIAST BP <80 MM HG: CPT | Performed by: FAMILY MEDICINE

## 2021-10-25 PROCEDURE — 90662 IIV NO PRSV INCREASED AG IM: CPT | Performed by: FAMILY MEDICINE

## 2021-10-25 PROCEDURE — 3077F SYST BP >= 140 MM HG: CPT | Performed by: FAMILY MEDICINE

## 2021-10-25 NOTE — PROGRESS NOTES
HPI: Bernerd Barthel is a 80year old female who presents for follow-up ER visit. Had breakfast with daughter and was supposed to go to her place. Daughter went down to her flat and took a shower. [place] a loud noise.   went to check her and she was on the huyen Rosuvastatin Calcium 10 MG Oral Tab, Take 1 tablet by mouth., Disp: , Rfl:   Multiple Vitamins-Minerals (MULTI-DAY PLUS MINERALS) Oral Tab, Take by mouth., Disp: , Rfl:   aspirin (ASPIRIN 81) 81 MG Oral Tab EC, Take 1 tablet (81 mg total) by mouth daily.

## 2021-10-26 ENCOUNTER — TELEPHONE (OUTPATIENT)
Dept: FAMILY MEDICINE CLINIC | Facility: CLINIC | Age: 86
End: 2021-10-26

## 2021-10-26 NOTE — TELEPHONE ENCOUNTER
Noted. Spoke with daughter, Lucille Gilbert. Had another episode of altered consciousness right now. Admitting her to [facility].  Advised daughter have them call her cardiologist

## 2021-10-26 NOTE — TELEPHONE ENCOUNTER
Spoke with pt's daughter ( not on JOSE),  verified, she stated she spent the noc with pt last noc.    She would like to inform Dr Luma See that pt is currently at DR RAYA BISH[facility] ER due to this morning at around 730 pt  passed out, she fell across the be

## 2021-10-27 ENCOUNTER — TELEPHONE (OUTPATIENT)
Dept: FAMILY MEDICINE CLINIC | Facility: CLINIC | Age: 86
End: 2021-10-27

## 2021-10-27 NOTE — TELEPHONE ENCOUNTER
Reviewed doctor's message as noted below with pt's daughter, Booker Hernandez. Booker Hernandez states doctor does not need to call her next Tuesday unless doctor wants to, she just wanted doctor to know pt is at [facility] and Dr. Huy Lama will be sent information.

## 2021-10-28 RX ORDER — ASPIRIN 81 MG/1
81 TABLET ORAL DAILY
Qty: 90 TABLET | Refills: 3 | Status: SHIPPED | OUTPATIENT
Start: 2021-10-28 | End: 2022-11-13

## 2021-10-28 NOTE — TELEPHONE ENCOUNTER
Message # [phone]         10/27/2021 05:57p   [ANNMARIE]  To:  From:  MERY Jarvis MD:  Phone#:  ----------------------------------------------------------------------  [facility] [phone] MICHA MASON  35 PCP WEILER AT [facility], BP IS NOT STABI

## 2021-10-28 NOTE — TELEPHONE ENCOUNTER
The patient 's daughter is asking when is the patient's appointment. Stated   Future Appointments   Date Time Provider Aranza Laurie   11/3/2021  3:30 PM Gio Corcoran  [facility]     Per the daughter she is doing ok.    She was disc

## 2021-10-28 NOTE — TELEPHONE ENCOUNTER
Spoke with daughter about possible hospital discharge, has been admitted to [facility] with dizziness and high blood pressure. Discussed parameters for safe discharge.   Reassured her that we would see her mother for hospital follow-up in timely fashion

## 2021-10-28 NOTE — TELEPHONE ENCOUNTER
[facility]     Requested Prescriptions   Pending Prescriptions Disp Refills    ASPIRIN LOW DOSE 81 MG Oral Tab EC [Pharmacy Med Name: ASPIRIN 81MG EC LOW DOSE TABLETS] 90 tablet 3     Sig: TAKE ONE TABLET BY MOUTH DAILY        Aspirin Protocol Passed - 10/28/2021  5:54 AM        Passed - Appointment in past 6 or next 3 months                  Recent Outpatient Visits              3 days ago Primary hypertension    [facility]    Office Visit    4 months ago Essential hypertension    [facility], Kelsey Casper, Aby Lagos MD    Office Visit    6 months ago Stage 3b chronic kidney disease [facility])    [facility] Kelsey Casper, [facility]    Office Visit    6 months ago Uncontrolled hypertension    [facility], Klesey Casper, Aby Lagos MD    Office Visit    6 months ago Postnasal discharge    [facility] Daily Willson MD    Office Visit

## 2021-11-03 ENCOUNTER — OFFICE VISIT (OUTPATIENT)
Dept: FAMILY MEDICINE CLINIC | Facility: CLINIC | Age: 86
End: 2021-11-03
Payer: MEDICARE

## 2021-11-03 VITALS
HEART RATE: 63 BPM | BODY MASS INDEX: 18.68 KG/M2 | HEIGHT: 67 IN | DIASTOLIC BLOOD PRESSURE: 75 MMHG | WEIGHT: 119 LBS | SYSTOLIC BLOOD PRESSURE: 193 MMHG

## 2021-11-03 DIAGNOSIS — I10 PRIMARY HYPERTENSION: ICD-10-CM

## 2021-11-03 DIAGNOSIS — R55 SYNCOPE, UNSPECIFIED SYNCOPE TYPE: Primary | ICD-10-CM

## 2021-11-03 PROCEDURE — 1111F DSCHRG MED/CURRENT MED MERGE: CPT | Performed by: FAMILY MEDICINE

## 2021-11-03 PROCEDURE — 3008F BODY MASS INDEX DOCD: CPT | Performed by: FAMILY MEDICINE

## 2021-11-03 PROCEDURE — 3077F SYST BP >= 140 MM HG: CPT | Performed by: FAMILY MEDICINE

## 2021-11-03 PROCEDURE — 3078F DIAST BP <80 MM HG: CPT | Performed by: FAMILY MEDICINE

## 2021-11-03 PROCEDURE — 99214 OFFICE O/P EST MOD 30 MIN: CPT | Performed by: FAMILY MEDICINE

## 2021-11-03 NOTE — PROGRESS NOTES
HPI: Babs Salcedo is a 80year old female who presents for hospital follow-up. Had episodes of unresponsiveness last week. She had been seen last week after episode. We had stopped all anti-histamines and Singulair. She then had another episode.   Was taken to tablet by mouth., Disp: , Rfl:   Multiple Vitamins-Minerals (MULTI-DAY PLUS MINERALS) Oral Tab, Take by mouth., Disp: , Rfl:   IRBESARTAN 300 MG Oral Tab, TAKE 1 TABLET(300 MG) BY MOUTH DAILY (Patient not taking: Reported on 11/3/2021), Disp: 90 tablet, Rf

## 2021-11-17 ENCOUNTER — OFFICE VISIT (OUTPATIENT)
Dept: FAMILY MEDICINE CLINIC | Facility: CLINIC | Age: 86
End: 2021-11-17
Payer: MEDICARE

## 2021-11-17 VITALS
RESPIRATION RATE: 18 BRPM | TEMPERATURE: 97 F | WEIGHT: 116 LBS | HEART RATE: 76 BPM | BODY MASS INDEX: 18 KG/M2 | DIASTOLIC BLOOD PRESSURE: 73 MMHG | SYSTOLIC BLOOD PRESSURE: 193 MMHG

## 2021-11-17 DIAGNOSIS — I10 PRIMARY HYPERTENSION: Primary | ICD-10-CM

## 2021-11-17 PROCEDURE — 99213 OFFICE O/P EST LOW 20 MIN: CPT | Performed by: FAMILY MEDICINE

## 2021-11-17 PROCEDURE — 3078F DIAST BP <80 MM HG: CPT | Performed by: FAMILY MEDICINE

## 2021-11-17 PROCEDURE — 3077F SYST BP >= 140 MM HG: CPT | Performed by: FAMILY MEDICINE

## 2021-11-17 NOTE — PROGRESS NOTES
HPI: Mika Ray is a 80year old female who presents for follow-up. BPs have been labile. Low in the morning. Daughter checks BP at noon and it is high frequently. She sometimes gives half tab of Irbesartan. Blood pressure is very high at night.      PMH: No current facility-administered medications on file prior to visit. Tobacco Use: no    Objective:   Gen: AOx3. NAD.    BP (!) 193/73   Pulse 76   Temp 96.5 °F (35.8 °C) (Temporal)   Resp 18   Wt 116 lb (52.6 kg)   BMI 18.17 kg/m²     Assessment:/Pl

## 2021-11-29 RX ORDER — LORATADINE 10 MG/1
10 TABLET ORAL DAILY
Qty: 90 TABLET | Refills: 1 | Status: SHIPPED | OUTPATIENT
Start: 2021-11-29 | End: 2022-05-26

## 2021-11-29 NOTE — TELEPHONE ENCOUNTER
Refill passed per [facility] protocol.      Requested Prescriptions   Pending Prescriptions Disp Refills    LORATADINE 10 MG Oral Tab [Pharmacy Med Name: LORATADINE 10MG TABLETS] 90 tablet 1     Sig: TAKE 1 TABLET(10 MG) BY MOUTH DAILY        Allergy Medication Protocol Passed - 11/27/2021  5:48 AM        Passed - Appoinment in past 12 or next 3 months              [unfilled]      @[facility] CenterVPRNTGRP@

## 2021-12-03 RX ORDER — MONTELUKAST SODIUM 10 MG/1
TABLET ORAL
Qty: 90 TABLET | Refills: 0 | Status: SHIPPED | OUTPATIENT
Start: 2021-12-03 | End: 2022-02-03

## 2021-12-15 ENCOUNTER — LAB ENCOUNTER (OUTPATIENT)
Dept: LAB | Age: 86
End: 2021-12-15
Attending: FAMILY MEDICINE
Payer: MEDICARE

## 2021-12-15 ENCOUNTER — TELEPHONE (OUTPATIENT)
Dept: FAMILY MEDICINE CLINIC | Facility: CLINIC | Age: 86
End: 2021-12-15

## 2021-12-15 DIAGNOSIS — R32 URINARY INCONTINENCE, UNSPECIFIED TYPE: ICD-10-CM

## 2021-12-15 DIAGNOSIS — R32 URINARY INCONTINENCE, UNSPECIFIED TYPE: Primary | ICD-10-CM

## 2021-12-15 PROCEDURE — 87086 URINE CULTURE/COLONY COUNT: CPT

## 2021-12-15 PROCEDURE — 81001 URINALYSIS AUTO W/SCOPE: CPT

## 2021-12-15 NOTE — TELEPHONE ENCOUNTER
Daughter reports new onset of urinary incontinence. Need to rule out infection. Order placed for UA and urine culture. Container given to daughter.

## 2021-12-27 RX ORDER — ROSUVASTATIN CALCIUM 10 MG/1
TABLET, COATED ORAL
Qty: 90 TABLET | Refills: 0 | Status: SHIPPED | OUTPATIENT
Start: 2021-12-27

## 2022-01-08 ENCOUNTER — TELEPHONE (OUTPATIENT)
Dept: FAMILY MEDICINE CLINIC | Facility: CLINIC | Age: 87
End: 2022-01-08

## 2022-01-08 NOTE — TELEPHONE ENCOUNTER
Action Requested: Summary for Provider     []  Critical Lab, Recommendations Needed  [] Need Additional Advice  []   FYI    []   Need Orders  [] Need Medications Sent to Pharmacy  []  Other     SUMMARY: daughter states patient was \"going down\" while gett

## 2022-02-03 RX ORDER — MONTELUKAST SODIUM 10 MG/1
TABLET ORAL
Qty: 90 TABLET | Refills: 0 | Status: SHIPPED | OUTPATIENT
Start: 2022-02-03

## 2022-02-23 ENCOUNTER — LAB ENCOUNTER (OUTPATIENT)
Dept: LAB | Age: 87
End: 2022-02-23
Attending: FAMILY MEDICINE
Payer: MEDICARE

## 2022-02-23 ENCOUNTER — OFFICE VISIT (OUTPATIENT)
Dept: FAMILY MEDICINE CLINIC | Facility: CLINIC | Age: 87
End: 2022-02-23
Payer: MEDICARE

## 2022-02-23 VITALS
HEIGHT: 64.5 IN | TEMPERATURE: 97 F | RESPIRATION RATE: 18 BRPM | SYSTOLIC BLOOD PRESSURE: 140 MMHG | WEIGHT: 126 LBS | HEART RATE: 67 BPM | BODY MASS INDEX: 21.25 KG/M2 | DIASTOLIC BLOOD PRESSURE: 72 MMHG

## 2022-02-23 DIAGNOSIS — D64.9 ANEMIA, UNSPECIFIED TYPE: ICD-10-CM

## 2022-02-23 DIAGNOSIS — G30.9 ALZHEIMER'S DEMENTIA WITHOUT BEHAVIORAL DISTURBANCE, UNSPECIFIED TIMING OF DEMENTIA ONSET (HCC): ICD-10-CM

## 2022-02-23 DIAGNOSIS — Z00.00 ENCOUNTER FOR ANNUAL HEALTH EXAMINATION: Primary | ICD-10-CM

## 2022-02-23 DIAGNOSIS — E78.00 HYPERCHOLESTEREMIA: ICD-10-CM

## 2022-02-23 DIAGNOSIS — E53.8 B12 DEFICIENCY: ICD-10-CM

## 2022-02-23 DIAGNOSIS — I73.9 PVD (PERIPHERAL VASCULAR DISEASE) (HCC): ICD-10-CM

## 2022-02-23 DIAGNOSIS — C69.91 MALIGNANT NEOPLASM OF RIGHT EYE (HCC): ICD-10-CM

## 2022-02-23 DIAGNOSIS — I10 ESSENTIAL HYPERTENSION: ICD-10-CM

## 2022-02-23 DIAGNOSIS — F02.80 ALZHEIMER'S DEMENTIA WITHOUT BEHAVIORAL DISTURBANCE, UNSPECIFIED TIMING OF DEMENTIA ONSET (HCC): ICD-10-CM

## 2022-02-23 DIAGNOSIS — Z00.00 ENCOUNTER FOR ANNUAL HEALTH EXAMINATION: ICD-10-CM

## 2022-02-23 DIAGNOSIS — N18.32 STAGE 3B CHRONIC KIDNEY DISEASE (HCC): ICD-10-CM

## 2022-02-23 LAB
ALBUMIN SERPL-MCNC: 3.4 G/DL (ref 3.4–5)
ALBUMIN/GLOB SERPL: 0.7 {RATIO} (ref 1–2)
ALP LIVER SERPL-CCNC: 86 U/L
ALT SERPL-CCNC: 21 U/L
ANION GAP SERPL CALC-SCNC: 6 MMOL/L (ref 0–18)
AST SERPL-CCNC: 29 U/L (ref 15–37)
BILIRUB SERPL-MCNC: 0.5 MG/DL (ref 0.1–2)
BUN BLD-MCNC: 22 MG/DL (ref 7–18)
BUN/CREAT SERPL: 15.1 (ref 10–20)
CALCIUM BLD-MCNC: 8.9 MG/DL (ref 8.5–10.1)
CHLORIDE SERPL-SCNC: 110 MMOL/L (ref 98–112)
CHOLEST SERPL-MCNC: 168 MG/DL (ref ?–200)
CO2 SERPL-SCNC: 27 MMOL/L (ref 21–32)
CREAT BLD-MCNC: 1.46 MG/DL
DEPRECATED RDW RBC AUTO: 50.1 FL (ref 35.1–46.3)
ERYTHROCYTE [DISTWIDTH] IN BLOOD BY AUTOMATED COUNT: 13.7 % (ref 11–15)
EST. AVERAGE GLUCOSE BLD GHB EST-MCNC: 105 MG/DL (ref 68–126)
FASTING PATIENT LIPID ANSWER: NO
FASTING STATUS PATIENT QL REPORTED: NO
GLOBULIN PLAS-MCNC: 4.6 G/DL (ref 2.8–4.4)
GLUCOSE BLD-MCNC: 80 MG/DL (ref 70–99)
HBA1C MFR BLD: 5.3 % (ref ?–5.7)
HCT VFR BLD AUTO: 37.5 %
HDLC SERPL-MCNC: 57 MG/DL (ref 40–59)
HGB BLD-MCNC: 11.4 G/DL
LDLC SERPL CALC-MCNC: 83 MG/DL (ref ?–100)
MCH RBC QN AUTO: 30.3 PG (ref 26–34)
MCHC RBC AUTO-ENTMCNC: 30.4 G/DL (ref 31–37)
MCV RBC AUTO: 99.7 FL
NONHDLC SERPL-MCNC: 111 MG/DL (ref ?–130)
OSMOLALITY SERPL CALC.SUM OF ELEC: 298 MOSM/KG (ref 275–295)
PLATELET # BLD AUTO: 220 10(3)UL (ref 150–450)
POTASSIUM SERPL-SCNC: 3.8 MMOL/L (ref 3.5–5.1)
PROT SERPL-MCNC: 8 G/DL (ref 6.4–8.2)
RBC # BLD AUTO: 3.76 X10(6)UL
SODIUM SERPL-SCNC: 143 MMOL/L (ref 136–145)
TRIGL SERPL-MCNC: 162 MG/DL (ref 30–149)
TSI SER-ACNC: 1.92 MIU/ML (ref 0.36–3.74)
VIT B12 SERPL-MCNC: 673 PG/ML (ref 193–986)
VLDLC SERPL CALC-MCNC: 26 MG/DL (ref 0–30)
WBC # BLD AUTO: 6.2 X10(3) UL (ref 4–11)

## 2022-02-23 PROCEDURE — 83036 HEMOGLOBIN GLYCOSYLATED A1C: CPT

## 2022-02-23 PROCEDURE — 99397 PER PM REEVAL EST PAT 65+ YR: CPT | Performed by: FAMILY MEDICINE

## 2022-02-23 PROCEDURE — 80053 COMPREHEN METABOLIC PANEL: CPT

## 2022-02-23 PROCEDURE — 96160 PT-FOCUSED HLTH RISK ASSMT: CPT | Performed by: FAMILY MEDICINE

## 2022-02-23 PROCEDURE — 80061 LIPID PANEL: CPT

## 2022-02-23 PROCEDURE — 36415 COLL VENOUS BLD VENIPUNCTURE: CPT

## 2022-02-23 PROCEDURE — 3008F BODY MASS INDEX DOCD: CPT | Performed by: FAMILY MEDICINE

## 2022-02-23 PROCEDURE — 3077F SYST BP >= 140 MM HG: CPT | Performed by: FAMILY MEDICINE

## 2022-02-23 PROCEDURE — 3078F DIAST BP <80 MM HG: CPT | Performed by: FAMILY MEDICINE

## 2022-02-23 PROCEDURE — 82607 VITAMIN B-12: CPT

## 2022-02-23 PROCEDURE — 84443 ASSAY THYROID STIM HORMONE: CPT

## 2022-02-23 PROCEDURE — G0439 PPPS, SUBSEQ VISIT: HCPCS | Performed by: FAMILY MEDICINE

## 2022-02-23 PROCEDURE — 85027 COMPLETE CBC AUTOMATED: CPT

## 2022-02-23 RX ORDER — AMLODIPINE BESYLATE 5 MG/1
5 TABLET ORAL DAILY
COMMUNITY
Start: 2022-01-25

## 2022-02-23 RX ORDER — NYSTATIN 100000 U/G
1 CREAM TOPICAL 2 TIMES DAILY
Qty: 30 G | Refills: 1 | Status: SHIPPED | OUTPATIENT
Start: 2022-02-23 | End: 2022-03-02

## 2022-02-23 RX ORDER — METOPROLOL SUCCINATE 50 MG/1
50 TABLET, EXTENDED RELEASE ORAL DAILY
COMMUNITY
Start: 2022-02-01

## 2022-03-21 PROBLEM — D64.9 ANEMIA: Status: RESOLVED | Noted: 2020-07-30 | Resolved: 2022-03-21

## 2022-03-21 PROBLEM — G30.9 ALZHEIMER'S DEMENTIA WITHOUT BEHAVIORAL DISTURBANCE, UNSPECIFIED TIMING OF DEMENTIA ONSET (HCC): Status: ACTIVE | Noted: 2022-03-21

## 2022-03-21 PROBLEM — F03.90 DEMENTIA (HCC): Chronic | Status: ACTIVE | Noted: 2022-03-21

## 2022-03-21 PROBLEM — F03.90 DEMENTIA (HCC): Chronic | Status: RESOLVED | Noted: 2022-03-21 | Resolved: 2022-03-21

## 2022-03-21 PROBLEM — I73.9 PVD (PERIPHERAL VASCULAR DISEASE): Status: ACTIVE | Noted: 2022-03-21

## 2022-03-21 PROBLEM — F02.80 ALZHEIMER'S DEMENTIA WITHOUT BEHAVIORAL DISTURBANCE, UNSPECIFIED TIMING OF DEMENTIA ONSET: Status: ACTIVE | Noted: 2022-03-21

## 2022-03-21 PROBLEM — G30.9 ALZHEIMER'S DEMENTIA WITHOUT BEHAVIORAL DISTURBANCE, UNSPECIFIED TIMING OF DEMENTIA ONSET: Status: ACTIVE | Noted: 2022-03-21

## 2022-03-21 PROBLEM — I73.9 PVD (PERIPHERAL VASCULAR DISEASE) (HCC): Status: ACTIVE | Noted: 2022-03-21

## 2022-03-21 PROBLEM — F02.80 ALZHEIMER'S DEMENTIA WITHOUT BEHAVIORAL DISTURBANCE, UNSPECIFIED TIMING OF DEMENTIA ONSET (HCC): Status: ACTIVE | Noted: 2022-03-21

## 2022-03-21 PROBLEM — N18.32 STAGE 3B CHRONIC KIDNEY DISEASE (HCC): Status: ACTIVE | Noted: 2022-03-21

## 2022-03-28 RX ORDER — ROSUVASTATIN CALCIUM 10 MG/1
10 TABLET, COATED ORAL NIGHTLY
Qty: 90 TABLET | Refills: 1 | Status: SHIPPED | OUTPATIENT
Start: 2022-03-28 | End: 2022-11-12

## 2022-03-28 NOTE — TELEPHONE ENCOUNTER
Refill passed per [facility] protocol.      Requested Prescriptions   Pending Prescriptions Disp Refills    ROSUVASTATIN 10 MG Oral Tab [Pharmacy Med Name: ROSUVASTATIN 10MG TABLETS] 90 tablet 0     Sig: TAKE 1 TABLET(10 MG) BY MOUTH EVERY NIGHT        Cholesterol Medication Protocol Passed - 3/27/2022  5:47 AM        Passed - ALT in past 12 months        Passed - LDL in past 12 months        Passed - Last ALT < 80       Lab Results   Component Value Date    ALT 21 02/23/2022             Passed - Last LDL < 130     Lab Results   Component Value Date    LDL 83 02/23/2022               Passed - Appointment in past 12 or next 3 months                Recent Outpatient Visits              1 month ago Encounter for annual health examination    [facility]    Office Visit    4 months ago Primary hypertension    [facility] Kelsey Casper, [facility]    Office Visit    4 months ago Syncope, unspecified syncope type    [facility] Kelsey Casper, Davon Kong DO    Office Visit    5 months ago Primary hypertension    [facility] Kelsey Casper, Davon Kong DO    Office Visit    9 months ago Essential hypertension    [facility], Aby Emmanuel MD    Office Visit             Future Appointments         Provider Department Appt Notes    In 4 months Wiley Sanchez, [facility], Aby Emmnauel 6 month f/u

## 2022-04-18 ENCOUNTER — MED REC SCAN ONLY (OUTPATIENT)
Dept: FAMILY MEDICINE CLINIC | Facility: CLINIC | Age: 87
End: 2022-04-18

## 2022-04-18 RX ORDER — CARVEDILOL 12.5 MG/1
TABLET ORAL
Qty: 180 TABLET | Refills: 1 | Status: SHIPPED | OUTPATIENT
Start: 2022-04-18

## 2022-05-10 ENCOUNTER — TELEPHONE (OUTPATIENT)
Dept: FAMILY MEDICINE CLINIC | Facility: CLINIC | Age: 87
End: 2022-05-10

## 2022-05-10 NOTE — TELEPHONE ENCOUNTER
Received call from patients daughter, Jorgito Powell. She states that pharmacy refilled CARVEDILOL 12.5 MG Oral Tab. Surekha just wants to confirm that patient was taken off this medication and should not be taking it. Please advise.

## 2022-05-26 RX ORDER — LORATADINE 10 MG/1
10 TABLET ORAL DAILY
Qty: 90 TABLET | Refills: 1 | Status: SHIPPED | OUTPATIENT
Start: 2022-05-26

## 2022-05-26 NOTE — TELEPHONE ENCOUNTER
Patients mother called to see if she can get her daughter in sooner with any ENT. She does not want to wait until her scheduled appointment on 6/26/2017. She is willing to see anyone.   [facility].   Requested Prescriptions   Pending Prescriptions Disp Refills    LORATADINE 10 MG Oral Tab [Pharmacy Med Name: LORATADINE 10MG TABLETS] 90 tablet 1     Sig: TAKE 1 TABLET(10 MG) BY MOUTH DAILY        Allergy Medication Protocol Passed - 5/26/2022  5:51 AM        Passed - Appointment in past 12 or next 3 months            Recent Outpatient Visits              3 months ago Encounter for annual health examination    [facility], Kelsey Casper, Davon Kong [place]    Office Visit    6 months ago Primary hypertension    [facility], [place] Casper, [facility]    Office Visit    6 months ago Syncope, unspecified syncope type    [facility], Kelsey Casper, Davon Kong DO    Office Visit    7 months ago Primary hypertension    [facility], Kelsey Casper, Davon Kong DO    Office Visit    11 months ago Essential hypertension    [facility], [address] Jennifer Wray MD    Office Visit          Future Appointments         Provider Department Appt Notes    In 3 months Jose Mathew DO [facility], Benita Emmanuel [address] 6 month f/u

## 2022-06-04 RX ORDER — MONTELUKAST SODIUM 10 MG/1
TABLET ORAL
Qty: 90 TABLET | Refills: 1 | OUTPATIENT
Start: 2022-06-04

## 2022-07-25 RX ORDER — CLOPIDOGREL BISULFATE 75 MG/1
TABLET ORAL
Qty: 90 TABLET | Refills: 3 | Status: SHIPPED | OUTPATIENT
Start: 2022-07-25

## 2022-08-24 ENCOUNTER — OFFICE VISIT (OUTPATIENT)
Dept: FAMILY MEDICINE CLINIC | Facility: CLINIC | Age: 87
End: 2022-08-24
Payer: MEDICARE

## 2022-08-24 VITALS
HEART RATE: 63 BPM | WEIGHT: 134 LBS | RESPIRATION RATE: 16 BRPM | SYSTOLIC BLOOD PRESSURE: 146 MMHG | DIASTOLIC BLOOD PRESSURE: 75 MMHG | TEMPERATURE: 97 F | BODY MASS INDEX: 23 KG/M2

## 2022-08-24 DIAGNOSIS — I10 ESSENTIAL HYPERTENSION: Primary | ICD-10-CM

## 2022-08-24 PROCEDURE — 99213 OFFICE O/P EST LOW 20 MIN: CPT | Performed by: FAMILY MEDICINE

## 2022-08-24 PROCEDURE — 3077F SYST BP >= 140 MM HG: CPT | Performed by: FAMILY MEDICINE

## 2022-08-24 PROCEDURE — 3078F DIAST BP <80 MM HG: CPT | Performed by: FAMILY MEDICINE

## 2022-08-24 RX ORDER — MONTELUKAST SODIUM 10 MG/1
10 TABLET ORAL NIGHTLY
Qty: 90 TABLET | Refills: 1 | Status: SHIPPED | OUTPATIENT
Start: 2022-08-24

## 2022-09-26 RX ORDER — DONEPEZIL HYDROCHLORIDE 5 MG/1
5 TABLET, FILM COATED ORAL NIGHTLY
Qty: 90 TABLET | Refills: 1 | Status: SHIPPED | OUTPATIENT
Start: 2022-09-26

## 2022-09-26 NOTE — TELEPHONE ENCOUNTER
Protocol failed or has No Protocol, please review  Requested Prescriptions   Pending Prescriptions Disp Refills    DONEPEZIL 5 MG Oral Tab [Pharmacy Med Name: DONEPEZIL 5MG TABLETS] 90 tablet 3     Sig: TAKE 1 TABLET(5 MG) BY MOUTH EVERY NIGHT        There is no refill protocol information for this order           Recent Outpatient Visits              1 month ago Essential hypertension    [facility]    Office Visit    7 months ago Encounter for annual health examination    [facility], Kelsey Casper, [facility]    Office Visit    10 months ago Primary hypertension    [facility], Endernicol Casper, Raven Kong,     Office Visit    10 months ago Syncope, unspecified syncope type    [facility], Enderradha Casper, Raven Kong,     Office Visit    11 months ago Primary hypertension    [facility]    Office Visit

## 2022-10-12 ENCOUNTER — TELEPHONE (OUTPATIENT)
Dept: FAMILY MEDICINE CLINIC | Facility: CLINIC | Age: 87
End: 2022-10-12

## 2022-10-12 RX ORDER — IRBESARTAN 300 MG/1
300 TABLET ORAL NIGHTLY
COMMUNITY

## 2022-11-21 ENCOUNTER — TELEPHONE (OUTPATIENT)
Dept: FAMILY MEDICINE CLINIC | Facility: CLINIC | Age: 87
End: 2022-11-21

## 2022-11-21 RX ORDER — MONTELUKAST SODIUM 10 MG/1
10 TABLET ORAL NIGHTLY
Qty: 90 TABLET | Refills: 1 | Status: SHIPPED | OUTPATIENT
Start: 2022-11-21

## 2023-02-08 ENCOUNTER — OFFICE VISIT (OUTPATIENT)
Dept: FAMILY MEDICINE CLINIC | Facility: CLINIC | Age: 88
End: 2023-02-08
Payer: MEDICARE

## 2023-02-08 VITALS
RESPIRATION RATE: 17 BRPM | HEART RATE: 60 BPM | SYSTOLIC BLOOD PRESSURE: 136 MMHG | BODY MASS INDEX: 22 KG/M2 | DIASTOLIC BLOOD PRESSURE: 64 MMHG | WEIGHT: 130 LBS | TEMPERATURE: 96 F

## 2023-02-08 DIAGNOSIS — G30.9 ALZHEIMER DISEASE (HCC): ICD-10-CM

## 2023-02-08 DIAGNOSIS — F02.80 ALZHEIMER DISEASE (HCC): ICD-10-CM

## 2023-02-08 DIAGNOSIS — J31.0 CHRONIC RHINITIS: ICD-10-CM

## 2023-02-08 DIAGNOSIS — E04.1 THYROID NODULE: ICD-10-CM

## 2023-02-08 DIAGNOSIS — S09.90XD INJURY OF HEAD, SUBSEQUENT ENCOUNTER: Primary | ICD-10-CM

## 2023-02-08 PROCEDURE — 3075F SYST BP GE 130 - 139MM HG: CPT | Performed by: FAMILY MEDICINE

## 2023-02-08 PROCEDURE — 99214 OFFICE O/P EST MOD 30 MIN: CPT | Performed by: FAMILY MEDICINE

## 2023-02-08 PROCEDURE — 3078F DIAST BP <80 MM HG: CPT | Performed by: FAMILY MEDICINE

## 2023-02-08 RX ORDER — FLUTICASONE PROPIONATE 50 MCG
2 SPRAY, SUSPENSION (ML) NASAL DAILY
Qty: 1 EACH | Refills: 2 | Status: SHIPPED | OUTPATIENT
Start: 2023-02-08

## 2023-02-08 RX ORDER — CLOPIDOGREL BISULFATE 75 MG/1
75 TABLET ORAL DAILY
COMMUNITY
Start: 2023-01-15

## 2023-02-08 RX ORDER — FLUTICASONE PROPIONATE 50 MCG
SPRAY, SUSPENSION (ML) NASAL DAILY
COMMUNITY
End: 2023-02-08

## 2023-02-08 RX ORDER — METOPROLOL SUCCINATE 50 MG/1
50 TABLET, EXTENDED RELEASE ORAL DAILY
Qty: 90 TABLET | Refills: 0 | Status: SHIPPED | OUTPATIENT
Start: 2023-02-08

## 2023-03-02 RX ORDER — ROSUVASTATIN CALCIUM 10 MG/1
10 TABLET, COATED ORAL NIGHTLY
Qty: 90 TABLET | Refills: 3 | Status: SHIPPED | OUTPATIENT
Start: 2023-03-02

## 2023-03-02 NOTE — TELEPHONE ENCOUNTER
Patient's daughter Lestine Houston calling to request refill for Metoprolol and Rosuvastatin, as patient is completely out of Rosuvastatin and very low on metoprolol. Patient scheduled for 04/27/23 for MA visit.

## 2023-03-03 RX ORDER — ROSUVASTATIN CALCIUM 10 MG/1
TABLET, COATED ORAL
Qty: 90 TABLET | Refills: 1 | OUTPATIENT
Start: 2023-03-03

## 2023-03-03 NOTE — TELEPHONE ENCOUNTER
Please review; Protocol Failed/ no protocol. Rx Pended, authorize if appropriate      Requested Prescriptions   Pending Prescriptions Disp Refills    rosuvastatin 10 MG Oral Tab 90 tablet 1     Sig: Take 1 tablet (10 mg total) by mouth nightly.        Cholesterol Medication Protocol Failed - 3/2/2023  1:35 PM        Failed - ALT in past 12 months        Failed - LDL in past 12 months        Failed - Last ALT < 80     Lab Results   Component Value Date    ALT 21 02/23/2022             Failed - Last LDL < 130     Lab Results   Component Value Date    LDL 83 02/23/2022             Passed - In person appointment or virtual visit in the past 12 mos or appointment in next 3 mos     Recent Outpatient Visits              3 weeks ago Injury of head, subsequent encounter    [address], [address], [place], [place]    Office Visit    6 months ago Essential hypertension    [address], [address], [place], [place]    Office Visit    1 year ago Encounter for annual health examination    [address] [place] [address], [place], [place]    Office Visit    1 year ago Primary hypertension    [address], [address], [place], [place]    Office Visit    1 year ago Syncope, unspecified syncope type    [address], [address], [place], [place]    Office Visit          Future Appointments         Provider Department Appt Notes    In 1 month Nelida Robles  [facility] annual

## 2023-03-23 RX ORDER — DONEPEZIL HYDROCHLORIDE 5 MG/1
5 TABLET, FILM COATED ORAL NIGHTLY
Qty: 90 TABLET | Refills: 3 | Status: SHIPPED | OUTPATIENT
Start: 2023-03-23

## 2023-03-23 NOTE — TELEPHONE ENCOUNTER
Protocol failed or has No Protocol, please review  Requested Prescriptions   Pending Prescriptions Disp Refills    donepezil 5 MG Oral Tab 90 tablet 3     Sig: Take 1 tablet (5 mg total) by mouth nightly.        There is no refill protocol information for this order        Future Appointments         Provider Department Appt Notes    In 2 weeks [facility] [facility]     In 4 weeks Gio Hinojosa MD [address], [facility] f/u (last seen 10/2020)    In 1 month Indy Ceja  [facility] annual          Recent Outpatient Visits              1 month ago Injury of head, subsequent encounter    [address] [facility], [place], [place]    Office Visit    7 months ago Essential hypertension    [address], [address], [place], [place]    Office Visit    1 year ago Encounter for annual health examination    [address] [facility], [place], [place]    Office Visit    1 year ago Primary hypertension    [facility], [address], [place], [place]    Office Visit    1 year ago Syncope, unspecified syncope type    [address], [address], [place], [place]    Office Visit

## 2023-04-06 RX ORDER — CLOPIDOGREL BISULFATE 75 MG/1
TABLET ORAL
Qty: 90 TABLET | Refills: 0 | Status: SHIPPED | OUTPATIENT
Start: 2023-04-06

## 2023-04-06 NOTE — TELEPHONE ENCOUNTER
Please review. Protocol failed / No protocol.     Requested Prescriptions   Pending Prescriptions Disp Refills    CLOPIDOGREL 75 MG Oral Tab [Pharmacy Med Name: CLOPIDOGREL 75MG TABLETS] 90 tablet 0     Sig: TAKE 1 TABLET(75 MG) BY MOUTH DAILY       There is no refill protocol information for this order          Future Appointments         Provider Department Appt Notes    In 5 days [facility]     In 3 weeks DO Artur Juan, [facility] annual    In 1 month Zoe Díaz MD [address], [address] [facility] f/u (last seen 10/2020)            Recent Outpatient Visits              1 month ago Injury of head, subsequent encounter    [address]    Office Visit    7 months ago Essential hypertension    [address], [address], [place]    Office Visit    1 year ago Encounter for annual health examination    [facility] [place]    Office Visit    1 year ago Primary hypertension    [facility], [address], [place], [place]    Office Visit    1 year ago Syncope, unspecified syncope type    [address], [address], [place]    Office Visit

## 2023-04-11 ENCOUNTER — HOSPITAL ENCOUNTER (OUTPATIENT)
Dept: ULTRASOUND IMAGING | Facility: HOSPITAL | Age: 88
Discharge: HOME | End: 2023-04-11
Attending: FAMILY MEDICINE
Payer: MEDICARE

## 2023-04-11 DIAGNOSIS — E04.1 THYROID NODULE: ICD-10-CM

## 2023-04-11 PROCEDURE — 76536 US EXAM OF HEAD AND NECK: CPT | Performed by: FAMILY MEDICINE

## 2023-04-27 ENCOUNTER — LAB ENCOUNTER (OUTPATIENT)
Dept: LAB | Age: 88
End: 2023-04-27
Attending: FAMILY MEDICINE
Payer: MEDICARE

## 2023-04-27 ENCOUNTER — OFFICE VISIT (OUTPATIENT)
Dept: FAMILY MEDICINE CLINIC | Facility: CLINIC | Age: 88
End: 2023-04-27

## 2023-04-27 VITALS
HEART RATE: 60 BPM | HEIGHT: 65.55 IN | RESPIRATION RATE: 18 BRPM | WEIGHT: 132 LBS | BODY MASS INDEX: 21.73 KG/M2 | SYSTOLIC BLOOD PRESSURE: 145 MMHG | TEMPERATURE: 96 F | DIASTOLIC BLOOD PRESSURE: 71 MMHG

## 2023-04-27 DIAGNOSIS — N18.32 STAGE 3B CHRONIC KIDNEY DISEASE (HCC): ICD-10-CM

## 2023-04-27 DIAGNOSIS — G30.9 ALZHEIMER'S DEMENTIA WITHOUT BEHAVIORAL DISTURBANCE (HCC): ICD-10-CM

## 2023-04-27 DIAGNOSIS — I10 ESSENTIAL HYPERTENSION: ICD-10-CM

## 2023-04-27 DIAGNOSIS — E78.00 HYPERCHOLESTEREMIA: ICD-10-CM

## 2023-04-27 DIAGNOSIS — E53.8 B12 DEFICIENCY: ICD-10-CM

## 2023-04-27 DIAGNOSIS — F02.80 ALZHEIMER'S DEMENTIA WITHOUT BEHAVIORAL DISTURBANCE (HCC): ICD-10-CM

## 2023-04-27 DIAGNOSIS — D64.9 ANEMIA, UNSPECIFIED TYPE: ICD-10-CM

## 2023-04-27 DIAGNOSIS — C69.91 MALIGNANT NEOPLASM OF RIGHT EYE (HCC): ICD-10-CM

## 2023-04-27 DIAGNOSIS — I73.9 PVD (PERIPHERAL VASCULAR DISEASE) (HCC): ICD-10-CM

## 2023-04-27 DIAGNOSIS — E04.1 THYROID NODULE: ICD-10-CM

## 2023-04-27 DIAGNOSIS — M25.561 CHRONIC PAIN OF RIGHT KNEE: ICD-10-CM

## 2023-04-27 DIAGNOSIS — Z00.00 ENCOUNTER FOR ANNUAL HEALTH EXAMINATION: ICD-10-CM

## 2023-04-27 DIAGNOSIS — G89.29 CHRONIC PAIN OF RIGHT KNEE: ICD-10-CM

## 2023-04-27 DIAGNOSIS — Z00.00 ENCOUNTER FOR ANNUAL HEALTH EXAMINATION: Primary | ICD-10-CM

## 2023-04-27 LAB
ALBUMIN SERPL-MCNC: 3.8 G/DL (ref 3.4–5)
ANION GAP SERPL CALC-SCNC: 8 MMOL/L (ref 0–18)
BUN BLD-MCNC: 22 MG/DL (ref 7–18)
BUN/CREAT SERPL: 18.5 (ref 10–20)
CALCIUM BLD-MCNC: 9.1 MG/DL (ref 8.5–10.1)
CHLORIDE SERPL-SCNC: 109 MMOL/L (ref 98–112)
CHOLEST SERPL-MCNC: 154 MG/DL (ref ?–200)
CO2 SERPL-SCNC: 23 MMOL/L (ref 21–32)
CREAT BLD-MCNC: 1.19 MG/DL
EST. AVERAGE GLUCOSE BLD GHB EST-MCNC: 128 MG/DL (ref 68–126)
FASTING PATIENT LIPID ANSWER: YES
GFR SERPLBLD BASED ON 1.73 SQ M-ARVRAT: 44 ML/MIN/1.73M2 (ref 60–?)
GLUCOSE BLD-MCNC: 179 MG/DL (ref 70–99)
HBA1C MFR BLD: 6.1 % (ref ?–5.7)
HDLC SERPL-MCNC: 70 MG/DL (ref 40–59)
LDLC SERPL CALC-MCNC: 65 MG/DL (ref ?–100)
NONHDLC SERPL-MCNC: 84 MG/DL (ref ?–130)
OSMOLALITY SERPL CALC.SUM OF ELEC: 298 MOSM/KG (ref 275–295)
PHOSPHATE SERPL-MCNC: 2.6 MG/DL (ref 2.5–4.9)
POTASSIUM SERPL-SCNC: 3.7 MMOL/L (ref 3.5–5.1)
SODIUM SERPL-SCNC: 140 MMOL/L (ref 136–145)
TRIGL SERPL-MCNC: 106 MG/DL (ref 30–149)
TSI SER-ACNC: 4.5 MIU/ML (ref 0.36–3.74)
VLDLC SERPL CALC-MCNC: 16 MG/DL (ref 0–30)

## 2023-04-27 PROCEDURE — 80069 RENAL FUNCTION PANEL: CPT

## 2023-04-27 PROCEDURE — 3078F DIAST BP <80 MM HG: CPT | Performed by: FAMILY MEDICINE

## 2023-04-27 PROCEDURE — G0009 ADMIN PNEUMOCOCCAL VACCINE: HCPCS | Performed by: FAMILY MEDICINE

## 2023-04-27 PROCEDURE — 3077F SYST BP >= 140 MM HG: CPT | Performed by: FAMILY MEDICINE

## 2023-04-27 PROCEDURE — 3008F BODY MASS INDEX DOCD: CPT | Performed by: FAMILY MEDICINE

## 2023-04-27 PROCEDURE — 84443 ASSAY THYROID STIM HORMONE: CPT

## 2023-04-27 PROCEDURE — 80061 LIPID PANEL: CPT

## 2023-04-27 PROCEDURE — G0439 PPPS, SUBSEQ VISIT: HCPCS | Performed by: FAMILY MEDICINE

## 2023-04-27 PROCEDURE — 90677 PCV20 VACCINE IM: CPT | Performed by: FAMILY MEDICINE

## 2023-04-27 PROCEDURE — 96160 PT-FOCUSED HLTH RISK ASSMT: CPT | Performed by: FAMILY MEDICINE

## 2023-04-27 PROCEDURE — 1125F AMNT PAIN NOTED PAIN PRSNT: CPT | Performed by: FAMILY MEDICINE

## 2023-04-27 PROCEDURE — 83036 HEMOGLOBIN GLYCOSYLATED A1C: CPT

## 2023-04-27 PROCEDURE — 1159F MED LIST DOCD IN RCRD: CPT | Performed by: FAMILY MEDICINE

## 2023-04-27 PROCEDURE — 36415 COLL VENOUS BLD VENIPUNCTURE: CPT

## 2023-04-27 PROCEDURE — 1170F FXNL STATUS ASSESSED: CPT | Performed by: FAMILY MEDICINE

## 2023-04-27 RX ORDER — NEOMYCIN SULFATE, POLYMYXIN B SULFATE AND DEXAMETHASONE 3.5; 10000; 1 MG/ML; [USP'U]/ML; MG/ML
SUSPENSION/ DROPS OPHTHALMIC
COMMUNITY
Start: 2023-03-17

## 2023-05-01 DIAGNOSIS — R79.89 ELEVATED TSH: Primary | ICD-10-CM

## 2023-05-08 RX ORDER — FLUTICASONE PROPIONATE 50 MCG
2 SPRAY, SUSPENSION (ML) NASAL DAILY
Qty: 16 G | Refills: 6 | Status: SHIPPED | OUTPATIENT
Start: 2023-05-08

## 2023-05-09 NOTE — TELEPHONE ENCOUNTER
Refill passed per [place] [employer], [facility] protocol.     .  Requested Prescriptions   Pending Prescriptions Disp Refills    FLUTICASONE PROPIONATE 50 MCG/ACT Nasal Suspension [Pharmacy Med Name: FLUTICASONE 50MCG NASAL SP (120) RX] 16 g 0     Sig: SHAKE LIQUID AND USE 2 SPRAYS IN EACH NOSTRIL DAILY       Allergy Medication Protocol Passed - 5/8/2023  2:10 PM        Passed - In person appointment or virtual visit in the past 12 mos or appointment in next 3 mos     Recent Outpatient Visits              1 week ago Encounter for annual health examination    [address] [facility], [place], [place]    Office Visit    2 months ago Injury of head, subsequent encounter    [facility], [place] [facility]    Office Visit    8 months ago Essential hypertension    [facility], [place]    Office Visit    1 year ago Encounter for annual health examination    [address] [facility], [place], [facility]    Office Visit    1 year ago Primary hypertension    [facility], [address], [place]    Office Visit          Future Appointments         Provider Department Appt Notes    In 3 weeks MD Christopher Robertson, [address] [facility] f/u (last seen 10/2020)  confirmed    In 5 months DO Christopher eKe [address], [place] 6 Month Follow Up                    Recent Outpatient Visits              1 week ago Encounter for annual health examination    [address] [facility] [place] [facility]    Office Visit    2 months ago Injury of head, subsequent encounter    [facility], [place] [facility]    Office Visit    8 months ago Essential hypertension    [place] Rui Everett DO    Office Visit    1 year ago Encounter for annual health examination    [address] [facility], [place], [place]    Office Visit    1 year ago Primary hypertension    [facility], [place], [place]    Office Visit            Future Appointments         Provider Department Appt Notes    In 3 weeks Ami Marshall MD [address], [address] [facility] f/u (last seen 10/2020)  confirmed    In 5 months Rui Everett DO [address], [facility], [facility] 6 Month Follow Up

## 2023-05-10 PROBLEM — G30.9 ALZHEIMER'S DEMENTIA WITHOUT BEHAVIORAL DISTURBANCE (HCC): Status: ACTIVE | Noted: 2022-03-21

## 2023-05-10 PROBLEM — F02.80 ALZHEIMER'S DEMENTIA WITHOUT BEHAVIORAL DISTURBANCE (HCC): Status: ACTIVE | Noted: 2022-03-21

## 2023-05-19 ENCOUNTER — MED REC SCAN ONLY (OUTPATIENT)
Dept: FAMILY MEDICINE CLINIC | Facility: CLINIC | Age: 88
End: 2023-05-19

## 2023-06-06 NOTE — TELEPHONE ENCOUNTER
Pt's daughter stated pt has 3 pills left   Please review. Protocol failed/ No protocol      Requested Prescriptions   Pending Prescriptions Disp Refills    metoprolol succinate ER 50 MG Oral Tablet 24 Hr 90 tablet 0     Sig: Take 1 tablet (50 mg total) by mouth daily. Hypertensive Medications Protocol Failed - 6/6/2023 10:49 AM        Failed - CMP or BMP in past 6 months     No results found for this or any previous visit (from the past 4392 hour(s)).               Failed - EGFRCR or GFRAA > 50     GFR Evaluation  EGFRCR: 44 , resulted on 4/27/2023            Passed - In person appointment in the past 12 or next 3 months     Recent Outpatient Visits              1 week ago [facility]    [address], [address], Brigette Gutierrez MD    Office Visit    1 month ago Encounter for annual health examination    [address] [facility], [place], [place]    Office Visit    3 months ago Injury of head, subsequent encounter    [address] [facility], [place], [place]    Office Visit    9 months ago Essential hypertension    [address], [address], [place], [place]    Office Visit    1 year ago Encounter for annual health examination    [address] [facility], [place], [place], [place]    Office Visit          Future Appointments         Provider Department Appt Notes    In 4 months Nelida Robles DO [address], [address], [facility] 6 Month Follow Up    In 6 months [facility], [address], [place] 6 Month F/U               Passed - Last BP reading less than 140/90     BP Readings from Last 1 Encounters:  05/30/23 : 122/58                Passed - In person appointment or virtual visit in the past 6 months     Recent Outpatient Visits              1 week ago Alzheimer's dementia without behavioral disturbance ([facility]    [address], [address], Jennifer Hernandez MD    Office Visit    1 month ago Encounter for annual health examination    [address], [place], [place], [place]    Office Visit    3 months ago Injury of head, subsequent encounter    [address], [place], [place], [place]    Office Visit    9 months ago Essential hypertension    [address], [address], [place], [place], [place]    Office Visit    1 year ago Encounter for annual health examination    [address] [facility] Visit          Future Appointments         Provider Department Appt Notes    In 4 months Almon Plume, DO [address], [address], [place] 6 Month Follow Up    In 6 months Tamiko Blanchard MD [address], [address], [place] 6 Month F/U                    Future Appointments         Provider Department Appt Notes    In 4 months Almon Plume, DO [address], [address], [place] 6 Month Follow Up    In 6 months Tamiko Blanchard MD [address], [address], [place] 6 Month F/U             Recent Outpatient Visits              1 week ago Alzheimer's dementia without behavioral disturbance [facility])    [address], [address], Jennifer Hernandez MD    Office Visit    1 month ago Encounter for annual health examination    [address] [facility], [place], [place], [place]    Office Visit    3 months ago Injury of head, subsequent encounter    [address], [address], [place], [place], [place]    Office Visit    9 months ago Essential hypertension    [address], [place], [place], [place]    Office Visit    1 year ago Encounter for annual health examination    [address] [place]    Office Visit         we

## 2023-06-07 RX ORDER — METOPROLOL SUCCINATE 50 MG/1
50 TABLET, EXTENDED RELEASE ORAL DAILY
Qty: 90 TABLET | Refills: 3 | Status: SHIPPED | OUTPATIENT
Start: 2023-06-07

## 2023-07-26 ENCOUNTER — NURSE TRIAGE (OUTPATIENT)
Dept: FAMILY MEDICINE CLINIC | Facility: CLINIC | Age: 88
End: 2023-07-26

## 2023-07-26 NOTE — TELEPHONE ENCOUNTER
Action Requested: Summary for Provider     []  Critical Lab, Recommendations Needed  [x] Need Additional Advice  []   FYI    []   Need Orders  [] Need Medications Sent to Pharmacy  []  Other     SUMMARY: Per protocol, patient should be able to manage symptoms with home care. Daughter seeking recommendation from provider. Reason for call: Medication Question and Itching  Onset:     Patient's daughter Lestine Houston called (on JOSE), verified patient's Name and . She states that she went to the pharmacy to ask for an anti-itch cream or medication that the patient can take as Benadryl cream is not working. Aside from the Benadryl cream, daughter also tried healing Vaseline, Aquaphor, Aveeno, and Cerave which helped for a little while. She was advised to try Zyrtec. She states patient has had complaints of generalized itching for the past 6-8 months. Red, raised rash was noted at the back of the patient's neck today. She states patient has dementia and does not realize she is scratching hard at times. Daughter also recalls patient being on 2 allergy medications that were discontinued since it made the patient dehydrated. Daughter cannot recall what those 2 medications were. Medication list reviewed, possibly montelukast and loratadine? She wants to know if taking Zyrtec is advisable or if there are any other recommendation. Dr. Joel Brandt (for Dr. Tacos Flood) please advise.     Reason for Disposition   Caller has medicine question only, adult not sick, and triager answers question    Protocols used: Medication Question Darius Hazard

## 2023-07-26 NOTE — TELEPHONE ENCOUNTER
Spoke to patient's daughter Davie Otoole (on JOSE), verified Name and . Relayed Dr. Tianna Luna message below. Daughter verbalized understanding. Appointment scheduled.     Future Appointments   Date Time Provider Aranza Sullivan   2023 10:00 AM Angelique holman, DO [facility]   10/30/2023 10:15 AM Anil Geiger DO [facility]   2023  9:30 AM MD Lauren Campuzano [facility]

## 2023-07-26 NOTE — TELEPHONE ENCOUNTER
Pls call pt and have them make an appt to evaluate rash on neck that started today and the generalized itching

## 2023-07-31 ENCOUNTER — OFFICE VISIT (OUTPATIENT)
Dept: FAMILY MEDICINE CLINIC | Facility: CLINIC | Age: 88
End: 2023-07-31

## 2023-07-31 ENCOUNTER — LAB ENCOUNTER (OUTPATIENT)
Dept: LAB | Facility: REFERENCE LAB | Age: 88
End: 2023-07-31
Attending: FAMILY MEDICINE
Payer: MEDICARE

## 2023-07-31 VITALS
BODY MASS INDEX: 20.79 KG/M2 | TEMPERATURE: 97 F | SYSTOLIC BLOOD PRESSURE: 164 MMHG | HEIGHT: 66 IN | DIASTOLIC BLOOD PRESSURE: 64 MMHG | WEIGHT: 129.38 LBS | HEART RATE: 56 BPM

## 2023-07-31 DIAGNOSIS — L29.9 ITCHING: ICD-10-CM

## 2023-07-31 DIAGNOSIS — L30.9 ECZEMA, UNSPECIFIED TYPE: ICD-10-CM

## 2023-07-31 DIAGNOSIS — R79.89 ELEVATED TSH: ICD-10-CM

## 2023-07-31 DIAGNOSIS — L29.9 ITCHING: Primary | ICD-10-CM

## 2023-07-31 LAB
ALBUMIN SERPL-MCNC: 3.4 G/DL (ref 3.4–5)
ALBUMIN/GLOB SERPL: 0.8 {RATIO} (ref 1–2)
ALP LIVER SERPL-CCNC: 74 U/L
ALT SERPL-CCNC: 32 U/L
ANION GAP SERPL CALC-SCNC: 8 MMOL/L (ref 0–18)
AST SERPL-CCNC: 34 U/L (ref 15–37)
BILIRUB SERPL-MCNC: 0.8 MG/DL (ref 0.1–2)
BUN BLD-MCNC: 17 MG/DL (ref 7–18)
BUN/CREAT SERPL: 14 (ref 10–20)
CALCIUM BLD-MCNC: 9 MG/DL (ref 8.5–10.1)
CHLORIDE SERPL-SCNC: 112 MMOL/L (ref 98–112)
CO2 SERPL-SCNC: 25 MMOL/L (ref 21–32)
CREAT BLD-MCNC: 1.21 MG/DL
DEPRECATED RDW RBC AUTO: 51.3 FL (ref 35.1–46.3)
EGFRCR SERPLBLD CKD-EPI 2021: 43 ML/MIN/1.73M2 (ref 60–?)
ERYTHROCYTE [DISTWIDTH] IN BLOOD BY AUTOMATED COUNT: 14.4 % (ref 11–15)
FASTING STATUS PATIENT QL REPORTED: NO
GLOBULIN PLAS-MCNC: 4.1 G/DL (ref 2.8–4.4)
GLUCOSE BLD-MCNC: 147 MG/DL (ref 70–99)
HCT VFR BLD AUTO: 37.9 %
HGB BLD-MCNC: 12.1 G/DL
MCH RBC QN AUTO: 30.9 PG (ref 26–34)
MCHC RBC AUTO-ENTMCNC: 31.9 G/DL (ref 31–37)
MCV RBC AUTO: 96.9 FL
OSMOLALITY SERPL CALC.SUM OF ELEC: 304 MOSM/KG (ref 275–295)
PLATELET # BLD AUTO: 278 10(3)UL (ref 150–450)
POTASSIUM SERPL-SCNC: 3.8 MMOL/L (ref 3.5–5.1)
PROT SERPL-MCNC: 7.5 G/DL (ref 6.4–8.2)
RBC # BLD AUTO: 3.91 X10(6)UL
SODIUM SERPL-SCNC: 145 MMOL/L (ref 136–145)
THYROPEROXIDASE AB SERPL-ACNC: >1300 U/ML (ref ?–60)
TSI SER-ACNC: 2.84 MIU/ML (ref 0.36–3.74)
WBC # BLD AUTO: 5.8 X10(3) UL (ref 4–11)

## 2023-07-31 PROCEDURE — 3078F DIAST BP <80 MM HG: CPT | Performed by: FAMILY MEDICINE

## 2023-07-31 PROCEDURE — 1160F RVW MEDS BY RX/DR IN RCRD: CPT | Performed by: FAMILY MEDICINE

## 2023-07-31 PROCEDURE — 86376 MICROSOMAL ANTIBODY EACH: CPT

## 2023-07-31 PROCEDURE — 3077F SYST BP >= 140 MM HG: CPT | Performed by: FAMILY MEDICINE

## 2023-07-31 PROCEDURE — 1126F AMNT PAIN NOTED NONE PRSNT: CPT | Performed by: FAMILY MEDICINE

## 2023-07-31 PROCEDURE — 3008F BODY MASS INDEX DOCD: CPT | Performed by: FAMILY MEDICINE

## 2023-07-31 PROCEDURE — 80053 COMPREHEN METABOLIC PANEL: CPT

## 2023-07-31 PROCEDURE — 85027 COMPLETE CBC AUTOMATED: CPT

## 2023-07-31 PROCEDURE — 1159F MED LIST DOCD IN RCRD: CPT | Performed by: FAMILY MEDICINE

## 2023-07-31 PROCEDURE — 36415 COLL VENOUS BLD VENIPUNCTURE: CPT

## 2023-07-31 PROCEDURE — 1170F FXNL STATUS ASSESSED: CPT | Performed by: FAMILY MEDICINE

## 2023-07-31 PROCEDURE — 84443 ASSAY THYROID STIM HORMONE: CPT

## 2023-07-31 PROCEDURE — 99214 OFFICE O/P EST MOD 30 MIN: CPT | Performed by: FAMILY MEDICINE

## 2023-07-31 RX ORDER — TRIAMCINOLONE ACETONIDE 1 MG/G
CREAM TOPICAL 2 TIMES DAILY PRN
Qty: 60 G | Refills: 0 | Status: SHIPPED | OUTPATIENT
Start: 2023-07-31

## 2023-07-31 NOTE — TELEPHONE ENCOUNTER
Please review; protocol failed. No future labs noted       Requested Prescriptions   Pending Prescriptions Disp Refills    METOPROLOL SUCCINATE ER 50 MG Oral Tablet 24 Hr [Pharmacy Med Name: METOPROLOL ER SUCCINATE 50MG TABS] 90 tablet 3     Sig: TAKE 1 TABLET(50 MG) BY MOUTH DAILY       Hypertensive Medications Protocol Failed - 7/29/2023  4:49 AM        Failed - CMP or BMP in past 6 months     No results found for this or any previous visit (from the past 4392 hour(s)).             Failed - EGFRCR or GFRAA > 50     GFR Evaluation  EGFRCR: 44 , resulted on 4/27/2023          Passed - In person appointment in the past 12 or next 3 months     Recent Outpatient Visits              Today [id number] [place] [address], [place], [place], [place]    Office Visit    2 months ago Alzheimer's dementia without behavioral disturbance [facility])    [address], [address], Sultana Delgado MD    Office Visit    3 months ago Encounter for annual health examination    [place]    Office Visit    5 months ago Injury of head, subsequent encounter    [place] [place] [place]    Office Visit    11 months ago Essential hypertension    [address], [address], [place], [place], [place]    Office Visit          Future Appointments         Provider Department Appt Notes    In 3 months Deepti Byrd DO [address], [address], [facility] 6 Month Follow Up    In 4 months [facility], [address], [place] 6 Month F/U               Passed - Last BP reading less than 140/90     BP Readings from Last 1 Encounters:  07/31/23 : (!) 164/64              Passed - In person appointment or virtual visit in the past 6 months     Recent Outpatient Visits              Today Itching [address] Baxter, [place], [place], [place]    Office Visit    2 months ago Alzheimer's dementia without behavioral disturbance [facility])    [address], [address], Abelardo Denny MD    Office Visit    3 months ago Encounter for annual health examination    [place], [facility] Visit    5 months ago Injury of head, subsequent encounter    Khris Dee, [place], [place]    Office Visit    11 months ago Essential hypertension    [address], [address], [place], [place], [place]    Office Visit          Future Appointments         Provider Department Appt Notes    In 3 months Iman Mora DO [address], [address], [address] 6 Month Follow Up    In 4 months Beto Ferreira MD [address], [address], [place] 6 Month F/U                  Future Appointments         Provider Department Appt Notes    In 3 months Iman Mora DO [address], [address], [address] 6 Month Follow Up    In 4 months Beto Ferreira MD [address], [address], [place] 6 Month F/U          Recent Outpatient Visits              Today Itching    [facility], [address], [place], [place], [place]    Office Visit    2 months ago Alzheimer's dementia without behavioral disturbance [facility])    [address], [address], Abelardo Denny MD    Office Visit    3 months ago Encounter for annual health examination    Khris Dee, [place], [place]    Office Visit    5 months ago Injury of head, subsequent encounter    [place], [place], [place]    Office Visit    11 months ago Essential hypertension    [facility], [address], [place], [place]    Office Visit

## 2023-08-01 RX ORDER — METOPROLOL SUCCINATE 50 MG/1
50 TABLET, EXTENDED RELEASE ORAL DAILY
Qty: 90 TABLET | Refills: 1 | Status: SHIPPED | OUTPATIENT
Start: 2023-08-01

## 2023-08-03 ENCOUNTER — MED REC SCAN ONLY (OUTPATIENT)
Dept: FAMILY MEDICINE CLINIC | Facility: CLINIC | Age: 88
End: 2023-08-03

## 2023-08-24 ENCOUNTER — APPOINTMENT (OUTPATIENT)
Dept: URBAN - METROPOLITAN AREA CLINIC 244 | Age: 88
Setting detail: DERMATOLOGY
End: 2023-08-25

## 2023-08-24 DIAGNOSIS — I87.2 VENOUS INSUFFICIENCY (CHRONIC) (PERIPHERAL): ICD-10-CM

## 2023-08-24 DIAGNOSIS — L20.89 OTHER ATOPIC DERMATITIS: ICD-10-CM

## 2023-08-24 PROCEDURE — 99204 OFFICE O/P NEW MOD 45 MIN: CPT

## 2023-08-24 PROCEDURE — OTHER ADDITIONAL NOTES: OTHER

## 2023-08-24 PROCEDURE — OTHER PRESCRIPTION: OTHER

## 2023-08-24 PROCEDURE — OTHER COUNSELING: OTHER

## 2023-08-24 RX ORDER — CLOBETASOL PROPIONATE 0.5 MG/G
OINTMENT TOPICAL
Qty: 45 | Refills: 2 | Status: ERX

## 2023-08-24 RX ORDER — CLOBETASOL PROPIONATE 0.5 MG/G
OINTMENT TOPICAL
Qty: 45 | Refills: 2 | Status: ERX | COMMUNITY
Start: 2023-08-24

## 2023-08-24 RX ORDER — FLUOCINONIDE 0.5 MG/G
OINTMENT TOPICAL
Qty: 60 | Refills: 1 | Status: ERX | COMMUNITY
Start: 2023-08-24

## 2023-08-24 RX ORDER — FLUOCINONIDE 0.5 MG/G
OINTMENT TOPICAL
Qty: 60 | Refills: 1 | Status: ERX

## 2023-08-24 ASSESSMENT — LOCATION ZONE DERM
LOCATION ZONE: LEG
LOCATION ZONE: ARM

## 2023-08-24 ASSESSMENT — LOCATION SIMPLE DESCRIPTION DERM
LOCATION SIMPLE: RIGHT FOREARM
LOCATION SIMPLE: LEFT PRETIBIAL REGION
LOCATION SIMPLE: LEFT FOREARM
LOCATION SIMPLE: RIGHT PRETIBIAL REGION

## 2023-08-24 ASSESSMENT — LOCATION DETAILED DESCRIPTION DERM
LOCATION DETAILED: RIGHT PROXIMAL DORSAL FOREARM
LOCATION DETAILED: LEFT PROXIMAL DORSAL FOREARM
LOCATION DETAILED: LEFT PROXIMAL PRETIBIAL REGION
LOCATION DETAILED: RIGHT PROXIMAL PRETIBIAL REGION

## 2023-08-24 NOTE — HPI: RASH
How Severe Is Your Rash?: mild
Is This A New Presentation, Or A Follow-Up?: Rash
Additional History: Pt took a medication in the past but had to d/c because it made her faint, unsure name of Rx

## 2023-08-24 NOTE — PROCEDURE: ADDITIONAL NOTES
Render Risk Assessment In Note?: no
Additional Notes: Discussed different treatment options: topicals, Dupixent, Light montanez therapy, will start with topicals and re-eval in 2 weeks
Additional Safety/Bands:
Detail Level: Simple

## 2023-09-07 ENCOUNTER — APPOINTMENT (OUTPATIENT)
Dept: URBAN - METROPOLITAN AREA CLINIC 244 | Age: 88
Setting detail: DERMATOLOGY
End: 2023-09-07

## 2023-09-07 DIAGNOSIS — L20.89 OTHER ATOPIC DERMATITIS: ICD-10-CM

## 2023-09-07 PROCEDURE — OTHER PRESCRIPTION MEDICATION MANAGEMENT: OTHER

## 2023-09-07 PROCEDURE — OTHER COUNSELING: OTHER

## 2023-09-07 PROCEDURE — 99213 OFFICE O/P EST LOW 20 MIN: CPT

## 2023-09-07 ASSESSMENT — LOCATION SIMPLE DESCRIPTION DERM
LOCATION SIMPLE: RIGHT FOREARM
LOCATION SIMPLE: RIGHT PRETIBIAL REGION
LOCATION SIMPLE: LEFT PRETIBIAL REGION
LOCATION SIMPLE: LEFT FOREARM

## 2023-09-07 ASSESSMENT — LOCATION ZONE DERM
LOCATION ZONE: ARM
LOCATION ZONE: LEG

## 2023-09-07 ASSESSMENT — LOCATION DETAILED DESCRIPTION DERM
LOCATION DETAILED: LEFT PROXIMAL PRETIBIAL REGION
LOCATION DETAILED: LEFT PROXIMAL DORSAL FOREARM
LOCATION DETAILED: RIGHT PROXIMAL DORSAL FOREARM
LOCATION DETAILED: RIGHT PROXIMAL PRETIBIAL REGION

## 2023-09-07 NOTE — PROCEDURE: PRESCRIPTION MEDICATION MANAGEMENT
Continue Regimen: Clobetasol, fluocinonide PRN
Detail Level: Zone
Render In Strict Bullet Format?: No

## 2023-10-16 ENCOUNTER — OFFICE VISIT (OUTPATIENT)
Dept: FAMILY MEDICINE CLINIC | Facility: CLINIC | Age: 88
End: 2023-10-16
Payer: MEDICARE

## 2023-10-16 VITALS
WEIGHT: 120 LBS | RESPIRATION RATE: 18 BRPM | HEART RATE: 69 BPM | BODY MASS INDEX: 19 KG/M2 | DIASTOLIC BLOOD PRESSURE: 71 MMHG | SYSTOLIC BLOOD PRESSURE: 138 MMHG

## 2023-10-16 DIAGNOSIS — M79.601 RIGHT ARM PAIN: Primary | ICD-10-CM

## 2023-10-16 PROCEDURE — 1160F RVW MEDS BY RX/DR IN RCRD: CPT | Performed by: FAMILY MEDICINE

## 2023-10-16 PROCEDURE — 1159F MED LIST DOCD IN RCRD: CPT | Performed by: FAMILY MEDICINE

## 2023-10-16 PROCEDURE — 3075F SYST BP GE 130 - 139MM HG: CPT | Performed by: FAMILY MEDICINE

## 2023-10-16 PROCEDURE — 99213 OFFICE O/P EST LOW 20 MIN: CPT | Performed by: FAMILY MEDICINE

## 2023-10-16 PROCEDURE — 90662 IIV NO PRSV INCREASED AG IM: CPT | Performed by: FAMILY MEDICINE

## 2023-10-16 PROCEDURE — G0008 ADMIN INFLUENZA VIRUS VAC: HCPCS | Performed by: FAMILY MEDICINE

## 2023-10-16 PROCEDURE — 3078F DIAST BP <80 MM HG: CPT | Performed by: FAMILY MEDICINE

## 2023-10-16 RX ORDER — CLOBETASOL PROPIONATE 0.5 MG/G
OINTMENT TOPICAL
COMMUNITY
Start: 2023-10-09

## 2023-10-16 RX ORDER — FLUOCINONIDE 0.5 MG/G
OINTMENT TOPICAL
COMMUNITY
Start: 2023-10-09

## 2023-10-16 NOTE — PROGRESS NOTES
HPI: Gaurav Still is a 80year old female who presents for right arm pain. Started last Tuesday. Started suddenly when she was sitting in a chair looking out the window. She could not move right arm. Denies neck pain, chest pain or palpitations. Niece placed pillow underneath right arm which helped. Tylenol given which helped pain as well,   Sees Cardiology. Had normal stress test in 2022. Has appointment this Wednesday. PMH:    Past Medical History:   Diagnosis Date    Cancer ([facility] 1960    right eye    Cancer of eye ([facility]     Essential hypertension     Hyperlipidemia       Alg:  Penicillins and Sulfa Antibiotics   Meds: clobetasol 0.05 % External Ointment, APPLY TOPICALLY TO THE AFFECTED AREA TWICE DAILY FOR 2 WEEKS. MAX PER FLARE, Disp: , Rfl:   fluocinonide 0.05 % External Ointment, , Disp: , Rfl:   metoprolol succinate ER 50 MG Oral Tablet 24 Hr, Take 1 tablet (50 mg total) by mouth daily. , Disp: 90 tablet, Rfl: 1  triamcinolone 0.1 % External Cream, Apply topically 2 (two) times daily as needed. , Disp: 60 g, Rfl: 0  donepezil 10 MG Oral Tab, Take 1 tablet (10 mg total) by mouth nightly., Disp: 90 tablet, Rfl: 3  fluticasone propionate 50 MCG/ACT Nasal Suspension, 2 sprays by Nasal route daily. , Disp: 16 g, Rfl: 6  Neomycin-Polymyxin-Dexameth 3.5-11649-0.1 Ophthalmic Suspension, Will discontinue after 4/30/23, Disp: , Rfl:   rosuvastatin 10 MG Oral Tab, Take 1 tablet (10 mg total) by mouth nightly., Disp: 90 tablet, Rfl: 3  aspirin (ASPIRIN LOW DOSE) 81 MG Oral Tab EC, Take 1 tablet (81 mg total) by mouth daily. , Disp: 90 tablet, Rfl: 1  amLODIPine 5 MG Oral Tab, Take 1 tablet (5 mg total) by mouth daily. , Disp: , Rfl:   Multiple Vitamins-Minerals (MULTI-DAY PLUS MINERALS) Oral Tab, Take by mouth., Disp: , Rfl:     No current facility-administered medications on file prior to visit. Tobacco Use: no    Objective:   Gen: Alert, Calm and comfortable. There were no vitals taken for this visit.   CV:  Regular rate and rhythm; Systolic murmur noted  Lungs:  Clear to ausculation; good aeration               No wheezes, rales or rhonchi  MSK: normal exam.  No tenderness on palpation or movement. Assessment:/Plan:  Right arm pain  (primary encounter diagnosis)    Likely MSK as putting arm on pillow and taking Tylenol helped. No cardiac symptoms. Last stress test was normal.  Pt is meeting with Cardiology this Wednesday.      Rosaria Seip, DO

## 2023-11-29 RX ORDER — ROSUVASTATIN CALCIUM 10 MG/1
10 TABLET, COATED ORAL NIGHTLY
Qty: 90 TABLET | Refills: 3 | Status: SHIPPED | OUTPATIENT
Start: 2023-11-29

## 2023-11-29 NOTE — TELEPHONE ENCOUNTER
Refill passed per [facility] protocol.    Requested Prescriptions   Pending Prescriptions Disp Refills    ROSUVASTATIN 10 MG Oral Tab [Pharmacy Med Name: ROSUVASTATIN 10MG TABLETS] 90 tablet 3     Sig: TAKE 1 TABLET(10 MG) BY MOUTH EVERY NIGHT       Cholesterol Medication Protocol Passed - 11/28/2023  8:03 AM        Passed - ALT in past 12 months        Passed - LDL in past 12 months        Passed - Last ALT < 80     Lab Results   Component Value Date    ALT 32 07/31/2023             Passed - Last LDL < 130     Lab Results   Component Value Date    LDL 65 04/27/2023             Passed - In person appointment or virtual visit in the past 12 mos or appointment in next 3 mos     Recent Outpatient Visits              1 month ago Right arm pain    [facility] [place], Colleen M, DO    Office Visit    4 months ago Itching    [facility] We[place], Colleen M, DO    Office Visit    6 months ago Alzheimer's dementia without behavioral disturbance (HCC)    [facility] Jorge Devlin MD    Office Visit    7 months ago Encounter for annual health examination    [facility] Weiler, Colleen M, DO    Office Visit    9 months ago Injury of head, subsequent encounter    [facility] Weiler, Colleen M, DO    Office Visit          Future Appointments         Provider Department Appt Notes    In 6 days Jorge Devlin MD [facility] 6 Month F/U                    Recent Outpatient Visits              1 month ago Right arm pain    [facility], Colleen M, DO    Office Visit    4 months ago Itching    [facility] Weiler, Colleen M, DO    Office Visit    6 months ago Alzheimer's dementia without  behavioral disturbance (HCC)    [facility] Jorge Devlin MD    Office Visit    7 months ago Encounter for annual health examination    [facility] We[place], Colleen M,     Office Visit    9 months ago Injury of head, subsequent encounter    [facility] We[place], Colleen M, DO    Office Visit            Future Appointments         Provider Department Appt Notes    In 6 days Jorge Devlin MD [facility] 6 Month F/U

## 2023-12-06 ENCOUNTER — TELEPHONE (OUTPATIENT)
Dept: FAMILY MEDICINE CLINIC | Facility: CLINIC | Age: 88
End: 2023-12-06

## 2023-12-06 NOTE — TELEPHONE ENCOUNTER
Advised daughter Surekha(on HIPAA) that per CDC website: Flu, COVID-19, and RSV vaccines may be given at the same visit. Daughter verbalized understanding having the RSV and COVID shot tomorrow. Already had the flu shot. Immunization updated. Will postpone to update COVID and RSV.

## 2023-12-06 NOTE — TELEPHONE ENCOUNTER
Patients daughter calling to inform that patient has an appointment to have a Covid booster and RVS vaccination on the same day, she wants to make sure those are okay to have both at the same time, please advise .

## 2023-12-19 ENCOUNTER — TELEPHONE (OUTPATIENT)
Dept: FAMILY MEDICINE CLINIC | Facility: CLINIC | Age: 88
End: 2023-12-19

## 2024-01-31 RX ORDER — METOPROLOL SUCCINATE 50 MG/1
50 TABLET, EXTENDED RELEASE ORAL DAILY
Qty: 90 TABLET | Refills: 1 | Status: SHIPPED | OUTPATIENT
Start: 2024-01-31

## 2024-01-31 NOTE — TELEPHONE ENCOUNTER
Please review.  Protocol failed / No protocol.    Requested Prescriptions   Pending Prescriptions Disp Refills    METOPROLOL SUCCINATE ER 50 MG Oral Tablet 24 Hr [Pharmacy Med Name: METOPROLOL ER SUCCINATE 50MG TABS] 90 tablet 1     Sig: TAKE 1 TABLET(50 MG) BY MOUTH DAILY       Hypertensive Medications Protocol Failed - 1/30/2024  8:52 AM        Failed - EGFRCR or GFRAA > 50     GFR Evaluation  EGFRCR: 43 , resulted on 7/31/2023          Passed - In person appointment in the past 12 or next 3 months     Recent Outpatient Visits              [facility] months ago [facility]    Office Visit    6 months ago Itching    [facility] Colleen M, DO    Office Visit    8 months ago Alzheimer's dementia without behavioral disturbance (HCC)    [facility]    Office Visit    9 months ago Encounter for annual health examination    [facility] Colleen M, DO    Office Visit    11 months ago Injury of head, subsequent encounter    [facility]    Office Visit                      Passed - Last BP reading less than 140/90     BP Readings from Last 1 Encounters:   10/16/23 138/71               Passed - CMP or BMP in past 6 months     No results found for this or any previous visit (from the past 4392 hour(s)).            Passed - In person appointment or virtual visit in the past 6 months     Recent Outpatient Visits              3 months ago Right arm pain    [facility] Colleen M, DO    Office Visit    6 months ago Itching    [facility] Colleen M, DO    Office Visit    8 months ago Alzheimer's dementia without behavioral disturbance (HCC)     [facility]    Office Visit    9 months ago Encounter for annual health examination    [facility]    Office Visit    11 months ago Injury of head, subsequent encounter    [facility] DO    Office Visit                            Recent Outpatient Visits              3 months ago Right arm pain    [facility], DO    Office Visit    6 months ago Itching    [facility] Col[facility] M, DO    Office Visit    8 months ago Alzheimer's dementia without behavioral disturbance (HCC)    [facility]    Office Visit    9 months ago Encounter for annual health examination    [facility] Col[facility] M, DO    Office Visit    11 months ago Injury of head, subsequent encounter    [facility]    Office Visit

## 2024-03-07 ENCOUNTER — OFFICE VISIT (OUTPATIENT)
Dept: FAMILY MEDICINE CLINIC | Facility: CLINIC | Age: 89
End: 2024-03-07

## 2024-03-07 VITALS
DIASTOLIC BLOOD PRESSURE: 63 MMHG | HEART RATE: 65 BPM | RESPIRATION RATE: 18 BRPM | HEIGHT: 65.5 IN | SYSTOLIC BLOOD PRESSURE: 105 MMHG | TEMPERATURE: 98 F | BODY MASS INDEX: 18.77 KG/M2 | WEIGHT: 114 LBS

## 2024-03-07 DIAGNOSIS — F02.80 ALZHEIMER'S DEMENTIA WITHOUT BEHAVIORAL DISTURBANCE (HCC): ICD-10-CM

## 2024-03-07 DIAGNOSIS — E78.00 HYPERCHOLESTEREMIA: ICD-10-CM

## 2024-03-07 DIAGNOSIS — I10 ESSENTIAL HYPERTENSION: ICD-10-CM

## 2024-03-07 DIAGNOSIS — G30.9 ALZHEIMER'S DEMENTIA WITHOUT BEHAVIORAL DISTURBANCE (HCC): ICD-10-CM

## 2024-03-07 DIAGNOSIS — I73.9 PVD (PERIPHERAL VASCULAR DISEASE) (HCC): ICD-10-CM

## 2024-03-07 DIAGNOSIS — Z00.00 ENCOUNTER FOR ANNUAL HEALTH EXAMINATION: Primary | ICD-10-CM

## 2024-03-07 DIAGNOSIS — E04.1 THYROID NODULE: ICD-10-CM

## 2024-03-07 DIAGNOSIS — N18.32 STAGE 3B CHRONIC KIDNEY DISEASE (HCC): ICD-10-CM

## 2024-03-07 DIAGNOSIS — D64.9 ANEMIA, UNSPECIFIED TYPE: ICD-10-CM

## 2024-03-07 NOTE — PROGRESS NOTES
Subjective:   Gin Luo is a 88 year old female who presents for a Medicare Subsequent Annual Wellness visit (Pt already had Initial Annual Wellness) and scheduled follow up of multiple significant but stable problems.          88 yr old female who presents for Medicare annual. Retired .  Lives with daughter, Surekha.  Has homemaker 2 days per week. Has days when she doesn't want to eat.  Gives her Ensure daily. Has days when she doesn't want to get out of bed. Walks with walker. Anglican.     Saw Neurology last year and she was diagnosed with Late onset Alzheimer's.  On Donepezil. Not interested in seeing him again right now.     Follows with Dr. Hatch.  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  Wears hearing aides.     Sees Cardiology.  Stable.     Had visit from insurance company.  Had mild PAD on exam. Denies leg pain or swelling.     Has occasional knee pain.     History/Other:   Fall Risk Assessment:   She has been screened for Falls and is High Risk. Fall Prevention information provided to patient in After Visit Summary.    Do you feel unsteady when standing or walking?: Yes  Do you worry about falling?: Yes  Have you fallen in the past year?: No     Cognitive Assessment:   Abnormal  What day of the week is this?: Incorrect  What month is it?: Incorrect  What year is it?: Incorrect  Recall \"Ball\": Correct  Recall \"Flag\": Incorrect  Recall \"Tree\": Incorrect    Functional Ability/Status:   Gin Luo has some abnormal functions as listed below:  She has Dressing and/or Bathing issues based on screening of functional status.  Difficulty dressing or bathing?: Yes  Bathing or Showering: Cannot do without help  Dressing: Cannot do without help  She has Eating difficulties based on screening of functional status.She has Driving difficulties based on screening of functional status. She has Meal Preparation difficulties based on screening of functional status.She has  difficulties Managing Money/Bills based on screening of functional status.She has difficulties Shopping for Groceries based on screening of functional status. She has difficulties Taking Meds as Rx'd based on screening of functional status. She has Hearing problems based on screening of functional status.She has Vision problems based on screening of functional status. She has problems with Daily Activities based on screening of functional status. She has problems with Memory based on screening of functional status.       Depression Screening (PHQ-2/PHQ-9): PHQ-2 SCORE: 0  , done 3/7/2024   If you checked off any problems, how difficult have these problems made it for you to do your work, take care of things at home, or get along with other people?: Not difficult at all              Advanced Directives:   She does have a Living Will but we do NOT have it on file in Epic.    She has a Power of  for Health Care on file in [facility].  Discussed Advance Care Planning with patient (and family/surrogate if present). Standard forms made available to patient in After Visit Summary.      Patient Active Problem List   Diagnosis    Essential hypertension    Hypercholesteremia    Anemia    Alzheimer's dementia without behavioral disturbance ([facility])    PVD (peripheral vascular disease) ([facility])    Stage 3b chronic kidney disease ([facility])     Allergies:  She is allergic to penicillins and sulfa antibiotics.    Current Medications:  No outpatient medications have been marked as taking for the 3/7/24 encounter (Office Visit) with Weiler, Colleen M, DO.       Medical History:  She  has a past medical history of Cancer (HCC) (1960), Cancer of eye ([facility]), Essential hypertension, and Hyperlipidemia.  Surgical History:  She  has a past surgical history that includes hysterectomy; Eye surgery; thyroidectomy; cataract (Left); and other surgical history (1960).   Family History:  Her family history is not on file.  Social History:  She  reports  that she has never smoked. She has never been exposed to tobacco smoke. She has never used smokeless tobacco. She reports that she does not drink alcohol and does not use drugs.    Tobacco:  She has never smoked tobacco.    CAGE Alcohol Screen:   CAGE screening score of 0 on 3/7/2024, showing low risk of alcohol abuse.      Patient Care Team:  Weiler, Colleen M, DO as PCP - General (Family Medicine)    Review of Systems  ROS:   Allergic/Immuno:  Negative for environmental allergies and food allergies  Cardiovascular:  Negative for chest pain and irregular heartbeat/palpitations  Constitutional:  Positive for weight loss and decreased appetite  Endocrine:  Negative for abnormal sleep patterns, increased activity, polydipsia and polyphagia  ENMT:  Negative for ear drainage, hearing loss and nasal drainage  Eyes:  Negative for eye discharge and vision loss  Gastrointestinal:  Negative for abdominal pain, constipation, decreased appetite, diarrhea and vomiting  Genitourinary:  Negative for dysuria and hematuria  Hema/Lymph:  Negative for easy bleeding and easy bruising  Integumentary:  Negative for pruritus and rash  Musculoskeletal:  Negative for bone/joint symptoms  Neurological:  Positive for memory loss  Psychiatric:  Negative for inappropriate interaction and psychiatric symptoms      Objective:   Physical Exam       /63   Pulse 65   Temp 98 °F (36.7 °C) (Temporal)   Resp 18   Ht 5' 5.5\" (1.664 m)   Wt 114 lb (51.7 kg)   BMI 18.68 kg/m²  Estimated body mass index is 18.68 kg/m² as calculated from the following:    Height as of this encounter: 5' 5.5\" (1.664 m).    Weight as of this encounter: 114 lb (51.7 kg).    Medicare Hearing Assessment:   Hearing Screening    Screening Method: Questionnaire  I have a problem hearing over the telephone: Yes I have trouble following the conversations when two or more people are talking at the same time: Yes   I have trouble understanding things on the TV: Yes I have  to strain to understand conversations: Yes   I have to worry about missing the telephone ring or doorbell: Yes I have trouble hearing conversations in a noisy background such as a crowded room or restaurant: Yes   I get confused about where sounds come from: No I misunderstand some words in a sentence and need to ask people to repeat themselves: Yes   I especially have trouble understanding the speech of women and children: No I have trouble understanding the speaker in a large room such as at a meeting or place of [place]: No   Many people I talk to seem to mumble (or don't speak clearly): No People get annoyed because I misunderstand what they say: Yes   I misunderstand what others are saying and make inappropriate responses: No I avoid social activities because I cannot hear well and fear I will reply improperly: No   Family members and friends have told me they think I may have hearing loss: Yes                 Gen:  Well-nourished.  No distress.  HEENT: Conjunctive clear.  Reilly ear canals clear.  Reilly TMs intact with good landmarks noted.  Nares patent.  Oral mucous membrane moist.  Normal lips, teeth, and gums.  Oropharynx normal.  Neck supple.  Good ROM.  No LAD.  Thyroid normal.  CV:  Regular rate and rhythm; no murmurs  Lungs:  Clear to ausculation; good aeration               No wheezes, rales or rhonchi  Abd: soft, non-tender, non-distended          Normal bowel sounds; no masses          No hepatosplenomegaly  Ext: no edema noted      Assessment & Plan:   Gin Luo is a 88 year old female who presents for a Medicare Assessment.     1. Encounter for annual health examination (Primary)    Stable.  No recent falls.  No current concerns. Encouraged physical activity and more frequent meals. Plan for labs in 6 months.     2. Thyroid nodule    Check US as repeated US was recommended.  -     US THYROID (CPT=76536); Future; Expected date: 03/07/2024    3. Stage 3b chronic kidney disease (HCC)    Will check  labs at next appointment    4. PVD (peripheral vascular disease) (HCC)    Asymptomatic.  CPM    5. Alzheimer's dementia without behavioral disturbance (HCC)    Symptoms seem stable.  Do not wish to follow-up with Neurology right now.  Daughter states she would not want to take another pill.     6. Hypercholesteremia    On statin.  CPM    7. Essential hypertension    Well-controlled.  CPM    8. Anemia, unspecified type    Check CBC in 6 months.     The patient indicates understanding of these issues and agrees to the plan.  Reinforced healthy diet, lifestyle, and exercise.      Return in 6 months (on 9/7/2024).     Colleen Weiler, DO, 3/7/2024     Supplementary Documentation:   General Health:  In the past six months, have you lost more than 10 pounds without trying?: 2 - No  Has your appetite been poor?: No  Type of Diet: Balanced  How does the patient maintain a good energy level?: Other (walking twice a week)  How would you describe your daily physical activity?: Light  How would you describe your current health state?: Good  How do you maintain positive mental well-being?: Social Interaction  On a scale of 0 to 10, with 0 being no pain and 10 being severe pain, what is your pain level?: 0 - (None)  In the past six months, have you experienced urine leakage?: 1-Yes  At any time do you feel concerned for the safety/well-being of yourself and/or your children, in your home or elsewhere?: No  Have you had any immunizations at another office such as Influenza, Hepatitis B, Tetanus, or Pneumococcal?: No       Gin Luo's SCREENING SCHEDULE   Tests on this list are recommended by your physician but may not be covered, or covered at this frequency, by your insurer.   Please check with your insurance carrier before scheduling to verify coverage.   PREVENTATIVE SERVICES FREQUENCY &  COVERAGE DETAILS LAST COMPLETION DATE   Diabetes Screening    Fasting Blood Sugar /  Glucose    One screening every 12 months if never  tested or if previously tested but not diagnosed with pre-diabetes   One screening every 6 months if diagnosed with pre-diabetes Lab Results   Component Value Date     (H) 07/31/2023        Cardiovascular Disease Screening    Lipid Panel  Cholesterol  Lipoprotein (HDL)  Triglycerides Covered every 5 years for all Medicare beneficiaries without apparent signs or symptoms of cardiovascular disease Lab Results   Component Value Date    CHOLEST 154 04/27/2023    HDL 70 (H) 04/27/2023    LDL 65 04/27/2023    TRIG 106 04/27/2023         Electrocardiogram (EKG)   Covered if needed at Welcome to Medicare, and non-screening if indicated for medical reasons 04/26/2021      Ultrasound Screening for Abdominal Aortic Aneurysm (AAA) Covered once in a lifetime for one of the following risk factors    Men who are 65-75 years old and have ever smoked    Anyone with a family history -     Colorectal Cancer Screening  Covered for ages 50-85; only need ONE of the following:    Colonoscopy   Covered every 10 years    Covered every 2 years if patient is at high risk or previous colonoscopy was abnormal -    No recommendations at this time    Flexible Sigmoidoscopy   Covered every 4 years -    Fecal Occult Blood Test Covered annually -   Bone Density Screening    Bone density screening    Covered every 2 years after age 65 if diagnosed with risk of osteoporosis or estrogen deficiency.    Covered yearly for long-term glucocorticoid medication use (Steroids) No results found for this or any previous visit.      No recommendations at this time   Pap and Pelvic    Pap   Covered every 2 years for women at normal risk; Annually if at high risk -  No recommendations at this time    Chlamydia Annually if high risk -  No recommendations at this time   Screening Mammogram    Mammogram     Recommend annually for all female patients aged 40 and older    One baseline mammogram covered for patients aged 35-39 -    No recommendations at this time     Immunizations    Influenza Covered once per flu season  Please get every year 10/16/2023  No recommendations at this time    Pneumococcal Each vaccine (Fchsivg66 & Paqlowuyz15) covered once after 65 Prevnar 13: -    Zfplpioli64: 09/13/2019     No recommendations at this time    Hepatitis B One screening covered for patients with certain risk factors   -  No recommendations at this time    Tetanus Toxoid Not covered by Medicare Part B unless medically necessary (cut with metal); may be covered with your pharmacy prescription benefits -    Tetanus, Diptheria and Pertusis TD and TDaP Not covered by Medicare Part B -  No recommendations at this time    Zoster Not covered by Medicare Part B; may be covered with your pharmacy  prescription benefits -  No recommendations at this time

## 2024-03-07 NOTE — PATIENT INSTRUCTIONS
Gin Luo's SCREENING SCHEDULE   Tests on this list are recommended by your physician but may not be covered, or covered at this frequency, by your insurer.   Please check with your insurance carrier before scheduling to verify coverage.   PREVENTATIVE SERVICES FREQUENCY &  COVERAGE DETAILS LAST COMPLETION DATE   Diabetes Screening    Fasting Blood Sugar /  Glucose    One screening every 12 months if never tested or if previously tested but not diagnosed with pre-diabetes   One screening every 6 months if diagnosed with pre-diabetes Lab Results   Component Value Date     (H) 07/31/2023        Cardiovascular Disease Screening    Lipid Panel  Cholesterol  Lipoprotein (HDL)  Triglycerides Covered every 5 years for all Medicare beneficiaries without apparent signs or symptoms of cardiovascular disease Lab Results   Component Value Date    CHOLEST 154 04/27/2023    HDL 70 (H) 04/27/2023    LDL 65 04/27/2023    TRIG 106 04/27/2023         Electrocardiogram (EKG)   Covered if needed at Welcome to Medicare, and non-screening if indicated for medical reasons 04/26/2021      Ultrasound Screening for Abdominal Aortic Aneurysm (AAA) Covered once in a lifetime for one of the following risk factors   • Men who are 65-75 years old and have ever smoked   • Anyone with a family history -     Colorectal Cancer Screening  Covered for ages 50-85; only need ONE of the following:    Colonoscopy   Covered every 10 years    Covered every 2 years if patient is at high risk or previous colonoscopy was abnormal -    No recommendations at this time    Flexible Sigmoidoscopy   Covered every 4 years -    Fecal Occult Blood Test Covered annually -   Bone Density Screening    Bone density screening    Covered every 2 years after age 65 if diagnosed with risk of osteoporosis or estrogen deficiency.    Covered yearly for long-term glucocorticoid medication use (Steroids) No results found for this or any previous visit.      No  recommendations at this time   Pap and Pelvic    Pap   Covered every 2 years for women at normal risk; Annually if at high risk -  No recommendations at this time    Chlamydia Annually if high risk -  No recommendations at this time   Screening Mammogram    Mammogram     Recommend annually for all female patients aged 40 and older    One baseline mammogram covered for patients aged 35-39 -    No recommendations at this time    Immunizations    Influenza Covered once per flu season  Please get every year 10/16/2023  No recommendations at this time    Pneumococcal Each vaccine (Xucebhb08 & Ulkovuybg99) covered once after 65 Prevnar 13: -    Acunyivyb34: 09/13/2019     No recommendations at this time    Hepatitis B One screening covered for patients with certain risk factors   -  No recommendations at this time    Tetanus Toxoid Not covered by Medicare Part B unless medically necessary (cut with metal); may be covered with your pharmacy prescription benefits -    Tetanus, Diptheria and Pertusis TD and TDaP Not covered by Medicare Part B -  No recommendations at this time    Zoster Not covered by Medicare Part B; may be covered with your pharmacy  prescription benefits -  No recommendations at this time

## 2024-04-25 RX ORDER — DONEPEZIL HYDROCHLORIDE 10 MG/1
10 TABLET, FILM COATED ORAL NIGHTLY
Qty: 90 TABLET | Refills: 3 | OUTPATIENT
Start: 2024-04-25

## 2024-04-25 NOTE — TELEPHONE ENCOUNTER
LOV 05/30/23   NOV pt was due 11/30/23 for 6 month follow up    Refill request for pt donepezil 10 MG Oral Tab, reviewed by RN, denied request, pt needs appointment, and routed to provider for review.

## 2024-07-24 DIAGNOSIS — F02.80 ALZHEIMER'S DEMENTIA WITHOUT BEHAVIORAL DISTURBANCE (HCC): ICD-10-CM

## 2024-07-24 DIAGNOSIS — G30.9 ALZHEIMER'S DEMENTIA WITHOUT BEHAVIORAL DISTURBANCE (HCC): ICD-10-CM

## 2024-07-24 NOTE — TELEPHONE ENCOUNTER
Requested Prescriptions     Pending Prescriptions Disp Refills    DONEPEZIL 10 MG Oral Tab [Pharmacy Med Name: DONEPEZIL 10MG TABLETS] 90 tablet 0     Sig: TAKE 1 TABLET(10 MG) BY MOUTH EVERY NIGHT        Last OV: 5/30/2023 with a return in about 6 months (around 11/30/2023).    Next OV: None    [place]/;

## 2024-07-25 RX ORDER — DONEPEZIL HYDROCHLORIDE 10 MG/1
10 TABLET, FILM COATED ORAL NIGHTLY
Qty: 90 TABLET | Refills: 0 | Status: SHIPPED | OUTPATIENT
Start: 2024-07-25

## 2024-07-28 NOTE — TELEPHONE ENCOUNTER
Please Review. Protocol Failed; No Protocol   EGFRCR: 43 , resulted on 7/31/2023   Requested Prescriptions   Pending Prescriptions Disp Refills    METOPROLOL SUCCINATE ER 50 MG Oral Tablet 24 Hr [Pharmacy Med Name: METOPROLOL ER SUCCINATE 50MG TABS] 90 tablet 1     Sig: TAKE 1 TABLET(50 MG) BY MOUTH DAILY       Hypertension Medications Protocol Failed - 7/24/2024  5:49 AM        Failed - EGFRCR or GFRAA > 50     GFR Evaluation  EGFRCR: 43 , resulted on 7/31/2023          Passed - CMP or BMP in past 12 months        Passed - Last BP reading less than 140/90     BP Readings from Last 1 Encounters:   03/07/24 105/63               Passed - In person appointment or virtual visit in the past 12 mos or appointment in next 3 mos     Recent Outpatient Visits              4 months ago Encounter for annual health examination    [facility] Col[facility], [facility]    Office Visit    9 months ago Right arm pain    [facility]    Office Visit    12 months ago Itching    [facility]    Office Visit    1 year ago Alzheimer's dementia without behavioral disturbance (HCC)    [facility]    Office Visit    1 year ago Encounter for annual health examination    [facility]    Office Visit                               Recent Outpatient Visits              4 months ago Encounter for annual health examination    [facility], DO    Office Visit    9 months ago Right arm pain    [facility] Colleen M, DO    Office Visit    12 months ago Itching    [facility], DO    Office Visit    1 year  ago Alzheimer's dementia without behavioral disturbance (HCC)    [facility] [place] Jorge Devlin MD    Office Visit    1 year ago Encounter for annual health examination    [facility] Weiler, Colleen M,     Office Visit

## 2024-07-29 RX ORDER — METOPROLOL SUCCINATE 50 MG/1
50 TABLET, EXTENDED RELEASE ORAL DAILY
Qty: 90 TABLET | Refills: 0 | Status: SHIPPED | OUTPATIENT
Start: 2024-07-29 | End: 2024-09-10

## 2024-07-29 NOTE — TELEPHONE ENCOUNTER
1 refill given. From review Dr. Weiler last note due for 6 month follow up in September. Please  call to schedule with PCP.

## 2024-09-10 ENCOUNTER — LAB ENCOUNTER (OUTPATIENT)
Dept: LAB | Age: 89
End: 2024-09-10
Attending: FAMILY MEDICINE
Payer: MEDICARE

## 2024-09-10 ENCOUNTER — OFFICE VISIT (OUTPATIENT)
Dept: FAMILY MEDICINE CLINIC | Facility: CLINIC | Age: 89
End: 2024-09-10

## 2024-09-10 VITALS
TEMPERATURE: 97 F | WEIGHT: 97 LBS | HEART RATE: 76 BPM | DIASTOLIC BLOOD PRESSURE: 62 MMHG | OXYGEN SATURATION: 100 % | RESPIRATION RATE: 18 BRPM | SYSTOLIC BLOOD PRESSURE: 120 MMHG | BODY MASS INDEX: 16 KG/M2

## 2024-09-10 DIAGNOSIS — I10 ESSENTIAL HYPERTENSION: ICD-10-CM

## 2024-09-10 DIAGNOSIS — F02.80 ALZHEIMER'S DEMENTIA WITHOUT BEHAVIORAL DISTURBANCE (HCC): ICD-10-CM

## 2024-09-10 DIAGNOSIS — I10 ESSENTIAL HYPERTENSION: Primary | ICD-10-CM

## 2024-09-10 DIAGNOSIS — R63.4 WEIGHT LOSS: ICD-10-CM

## 2024-09-10 DIAGNOSIS — G30.9 ALZHEIMER'S DEMENTIA WITHOUT BEHAVIORAL DISTURBANCE (HCC): ICD-10-CM

## 2024-09-10 LAB
ALBUMIN SERPL-MCNC: 4.2 G/DL (ref 3.2–4.8)
ALBUMIN/GLOB SERPL: 1.1 {RATIO} (ref 1–2)
ALP LIVER SERPL-CCNC: 70 U/L
ALT SERPL-CCNC: 18 U/L
ANION GAP SERPL CALC-SCNC: 9 MMOL/L (ref 0–18)
AST SERPL-CCNC: 31 U/L (ref ?–34)
BASOPHILS # BLD AUTO: 0.03 X10(3) UL (ref 0–0.2)
BASOPHILS NFR BLD AUTO: 0.4 %
BILIRUB SERPL-MCNC: 0.7 MG/DL (ref 0.2–1.1)
BUN BLD-MCNC: 20 MG/DL (ref 9–23)
BUN/CREAT SERPL: 16.3 (ref 10–20)
CALCIUM BLD-MCNC: 9.4 MG/DL (ref 8.7–10.4)
CHLORIDE SERPL-SCNC: 106 MMOL/L (ref 98–112)
CHOLEST SERPL-MCNC: 148 MG/DL (ref ?–200)
CO2 SERPL-SCNC: 23 MMOL/L (ref 21–32)
CREAT BLD-MCNC: 1.23 MG/DL
DEPRECATED RDW RBC AUTO: 49.2 FL (ref 35.1–46.3)
EGFRCR SERPLBLD CKD-EPI 2021: 42 ML/MIN/1.73M2 (ref 60–?)
EOSINOPHIL # BLD AUTO: 0.01 X10(3) UL (ref 0–0.7)
EOSINOPHIL NFR BLD AUTO: 0.1 %
ERYTHROCYTE [DISTWIDTH] IN BLOOD BY AUTOMATED COUNT: 14.6 % (ref 11–15)
FASTING PATIENT LIPID ANSWER: YES
FASTING STATUS PATIENT QL REPORTED: YES
GLOBULIN PLAS-MCNC: 3.8 G/DL (ref 2–3.5)
GLUCOSE BLD-MCNC: 236 MG/DL (ref 70–99)
HCT VFR BLD AUTO: 36.6 %
HDLC SERPL-MCNC: 56 MG/DL (ref 40–59)
HGB BLD-MCNC: 12.6 G/DL
IMM GRANULOCYTES # BLD AUTO: 0.02 X10(3) UL (ref 0–1)
IMM GRANULOCYTES NFR BLD: 0.3 %
LDLC SERPL CALC-MCNC: 77 MG/DL (ref ?–100)
LYMPHOCYTES # BLD AUTO: 1.16 X10(3) UL (ref 1–4)
LYMPHOCYTES NFR BLD AUTO: 16.2 %
MCH RBC QN AUTO: 31.8 PG (ref 26–34)
MCHC RBC AUTO-ENTMCNC: 34.4 G/DL (ref 31–37)
MCV RBC AUTO: 92.4 FL
MONOCYTES # BLD AUTO: 0.4 X10(3) UL (ref 0.1–1)
MONOCYTES NFR BLD AUTO: 5.6 %
NEUTROPHILS # BLD AUTO: 5.53 X10 (3) UL (ref 1.5–7.7)
NEUTROPHILS # BLD AUTO: 5.53 X10(3) UL (ref 1.5–7.7)
NEUTROPHILS NFR BLD AUTO: 77.4 %
NONHDLC SERPL-MCNC: 92 MG/DL (ref ?–130)
OSMOLALITY SERPL CALC.SUM OF ELEC: 296 MOSM/KG (ref 275–295)
PLATELET # BLD AUTO: 251 10(3)UL (ref 150–450)
POTASSIUM SERPL-SCNC: 4 MMOL/L (ref 3.5–5.1)
PROT SERPL-MCNC: 8 G/DL (ref 5.7–8.2)
RBC # BLD AUTO: 3.96 X10(6)UL
SODIUM SERPL-SCNC: 138 MMOL/L (ref 136–145)
TRIGL SERPL-MCNC: 79 MG/DL (ref 30–149)
TSI SER-ACNC: 3.88 MIU/ML (ref 0.55–4.78)
VLDLC SERPL CALC-MCNC: 12 MG/DL (ref 0–30)
WBC # BLD AUTO: 7.2 X10(3) UL (ref 4–11)

## 2024-09-10 PROCEDURE — 99214 OFFICE O/P EST MOD 30 MIN: CPT | Performed by: FAMILY MEDICINE

## 2024-09-10 PROCEDURE — 36415 COLL VENOUS BLD VENIPUNCTURE: CPT

## 2024-09-10 PROCEDURE — 80061 LIPID PANEL: CPT

## 2024-09-10 PROCEDURE — 84443 ASSAY THYROID STIM HORMONE: CPT

## 2024-09-10 PROCEDURE — 80053 COMPREHEN METABOLIC PANEL: CPT

## 2024-09-10 PROCEDURE — 83036 HEMOGLOBIN GLYCOSYLATED A1C: CPT

## 2024-09-10 PROCEDURE — 85025 COMPLETE CBC W/AUTO DIFF WBC: CPT

## 2024-09-10 RX ORDER — FLUTICASONE PROPIONATE 50 MCG
2 SPRAY, SUSPENSION (ML) NASAL DAILY
Qty: 16 G | Refills: 6 | Status: SHIPPED | OUTPATIENT
Start: 2024-09-10

## 2024-09-10 RX ORDER — METOPROLOL SUCCINATE 50 MG/1
50 TABLET, EXTENDED RELEASE ORAL DAILY
Qty: 90 TABLET | Refills: 3 | Status: SHIPPED | OUTPATIENT
Start: 2024-09-10

## 2024-09-10 RX ORDER — ROSUVASTATIN CALCIUM 10 MG/1
10 TABLET, COATED ORAL NIGHTLY
Qty: 90 TABLET | Refills: 3 | Status: SHIPPED | OUTPATIENT
Start: 2024-09-10

## 2024-09-10 RX ORDER — ASPIRIN 81 MG/1
81 TABLET ORAL DAILY
Qty: 90 TABLET | Refills: 3 | Status: SHIPPED | OUTPATIENT
Start: 2024-09-10

## 2024-09-10 RX ORDER — AMLODIPINE BESYLATE 5 MG/1
5 TABLET ORAL DAILY
Qty: 90 TABLET | Refills: 3 | Status: SHIPPED | OUTPATIENT
Start: 2024-09-10 | End: 2024-12-09

## 2024-09-10 NOTE — PROGRESS NOTES
HPI: Gin is a 89 year old female who presents for HTN follow-up. Has had some low BPs as she is laying down all the time and has lost a significant amount of weight. Takes Metoprolol once per day.  Takes Amlodipine BID.     Has chronic itching all over body.     Struggling with appetite and weight. Lost 17 pounds.  Eats some veggies and fruit.  Drinks Ensure. Feels more tired and wants to sleep. Can sleep for days. Days and nights are confused. Denies blood in stool or urine.     PMH:    Past Medical History:    Cancer (HCC)    right eye    Cancer of eye (HCC)    Essential hypertension    Hyperlipidemia      Alg:  Penicillins and Sulfa antibiotics   Meds:   Current Outpatient Medications on File Prior to Visit   Medication Sig Dispense Refill    Polyvinyl Alcohol-Povidone (REFRESH OP) Apply to eye.      metoprolol succinate ER 50 MG Oral Tablet 24 Hr Take 1 tablet (50 mg total) by mouth daily. 90 tablet 0    DONEPEZIL 10 MG Oral Tab TAKE 1 TABLET(10 MG) BY MOUTH EVERY NIGHT 90 tablet 0    rosuvastatin 10 MG Oral Tab Take 1 tablet (10 mg total) by mouth nightly. 90 tablet 3    fluticasone propionate 50 MCG/ACT Nasal Suspension 2 sprays by Nasal route daily. 16 g 6    aspirin (ASPIRIN LOW DOSE) 81 MG Oral Tab EC Take 1 tablet (81 mg total) by mouth daily. 90 tablet 1    amLODIPine 5 MG Oral Tab Take 1 tablet (5 mg total) by mouth daily.      Multiple Vitamins-Minerals (MULTI-DAY PLUS MINERALS) Oral Tab Take by mouth. ProRenal      clobetasol 0.05 % External Ointment APPLY TOPICALLY TO THE AFFECTED AREA TWICE DAILY FOR 2 WEEKS. MAX PER FLARE (Patient not taking: Reported on 9/10/2024)      fluocinonide 0.05 % External Ointment  (Patient not taking: Reported on 9/10/2024)      triamcinolone 0.1 % External Cream Apply topically 2 (two) times daily as needed. (Patient not taking: Reported on 9/10/2024) 60 g 0    Neomycin-Polymyxin-Dexameth 3.5-20965-6.1 Ophthalmic Suspension Will discontinue after 4/30/23 (Patient not  taking: Reported on 9/10/2024)       No current facility-administered medications on file prior to visit.      Tobacco Use: no    ROS: see HPI    Objective:   Gen: AOx3. NAD.  /62   Pulse 76   Temp 97.2 °F (36.2 °C) (Temporal)   Resp 18   Wt 97 lb (44 kg)   SpO2 100%   BMI 15.90 kg/m²   CV:  Regular rate and rhythm; no murmurs  Lungs:  Clear to ausculation; good aeration               No wheezes, rales or rhonchi        Assessment:/Plan:  Encounter Diagnoses   Name Primary?    Essential hypertension    Has been running on the lower side.  Daughter is monitoring and holding medication if too low.  Switch Amlodipine to once daily and continue to monitor.    Yes    Alzheimer's dementia without behavioral disturbance (HCC)    Becoming more fatigued and has loss of appetite. Discussed palliative care with daughter.  Referral placed.  She will call and schedule. Refer to Neuro for follow-up.  Check urine culture as she has been more fatigued.        Weight loss    Encouraged pt to eat smaller, more frequent meals heavy in protein. Will check labs.       Colleen Weiler, DO

## 2024-09-11 LAB
EST. AVERAGE GLUCOSE BLD GHB EST-MCNC: 91 MG/DL (ref 68–126)
HBA1C MFR BLD: 4.8 % (ref ?–5.7)

## 2024-09-12 ENCOUNTER — TELEPHONE (OUTPATIENT)
Dept: HEMATOLOGY/ONCOLOGY | Facility: HOSPITAL | Age: 89
End: 2024-09-12

## 2024-09-12 NOTE — TELEPHONE ENCOUNTER
Palliative Care order received from Dr. alvarado.    I called and spoke with Surekha (Gin's dtr) re: Palliative Care Services and consult appointment.     Surekha is requesting [facility] instead of outpatient Palliative appointment for Gin since it has been a challenge to get her to/from appointments.     I let Surekha know I would relay her request for Community Palliative Care to Dr. Weiler.    Informed Surekha that [facility] will contact her when they receive order from Dr. Weiler's office.     Surekha was appreciative of information and verbalized understanding of above.

## 2024-09-13 ENCOUNTER — LAB ENCOUNTER (OUTPATIENT)
Dept: LAB | Age: 89
End: 2024-09-13
Attending: FAMILY MEDICINE
Payer: MEDICARE

## 2024-09-13 DIAGNOSIS — R63.4 WEIGHT LOSS: ICD-10-CM

## 2024-09-13 DIAGNOSIS — F02.80 ALZHEIMER'S DEMENTIA WITHOUT BEHAVIORAL DISTURBANCE (HCC): ICD-10-CM

## 2024-09-13 DIAGNOSIS — G30.9 ALZHEIMER'S DEMENTIA WITHOUT BEHAVIORAL DISTURBANCE (HCC): ICD-10-CM

## 2024-09-13 LAB
BILIRUB UR QL: NEGATIVE
CLARITY UR: CLEAR
COLOR UR: YELLOW
GLUCOSE UR-MCNC: NORMAL MG/DL
HGB UR QL STRIP.AUTO: NEGATIVE
HYALINE CASTS #/AREA URNS AUTO: PRESENT /LPF
KETONES UR-MCNC: NEGATIVE MG/DL
LEUKOCYTE ESTERASE UR QL STRIP.AUTO: 500
NITRITE UR QL STRIP.AUTO: NEGATIVE
PH UR: 5.5 [PH] (ref 5–8)
PROT UR-MCNC: 20 MG/DL
SP GR UR STRIP: 1.02 (ref 1–1.03)
UROBILINOGEN UR STRIP-ACNC: 2

## 2024-09-13 PROCEDURE — 87086 URINE CULTURE/COLONY COUNT: CPT

## 2024-09-13 PROCEDURE — 81001 URINALYSIS AUTO W/SCOPE: CPT

## 2024-09-16 ENCOUNTER — TELEPHONE (OUTPATIENT)
Dept: FAMILY MEDICINE CLINIC | Facility: CLINIC | Age: 89
End: 2024-09-16

## 2024-09-16 NOTE — TELEPHONE ENCOUNTER
[facility], states that they received an order/referral for palliative care for the patient, but, the insurance portion was blocked off. [facility], would like to know if the insurance can be faxed to them at fax: [phone]

## 2024-09-30 ENCOUNTER — TELEPHONE (OUTPATIENT)
Dept: FAMILY MEDICINE CLINIC | Facility: CLINIC | Age: 89
End: 2024-09-30

## 2024-09-30 NOTE — TELEPHONE ENCOUNTER
[facility]   [phone]   Fax: [phone]    I had a pall consult with patient today.  Family requested physical therapy.  Could Dr Weiler send that order?

## 2024-10-02 NOTE — TELEPHONE ENCOUNTER
Called palliative care NP, Eliud, no answer.     Left message to let us know diagnosis for PT and to confirm that it is home PT that family is requesting.     No

## 2024-10-22 ENCOUNTER — TELEPHONE (OUTPATIENT)
Dept: FAMILY MEDICINE CLINIC | Facility: CLINIC | Age: 89
End: 2024-10-22

## 2024-10-22 NOTE — TELEPHONE ENCOUNTER
Received a fax from [facility], signed by DR Tushar Oliveira, with lab orders. Called to verify what is needed from our clinic, as there is no note with the orders. Awaiting call back. If they need lab results, patient had most of them done last month, except vit B12.

## 2024-10-28 ENCOUNTER — TELEPHONE (OUTPATIENT)
Dept: FAMILY MEDICINE CLINIC | Facility: CLINIC | Age: 89
End: 2024-10-28

## 2024-10-28 DIAGNOSIS — G30.8 ALZHEIMER'S DEMENTIA OF OTHER ONSET, UNSPECIFIED DEMENTIA SEVERITY, UNSPECIFIED WHETHER BEHAVIORAL, PSYCHOTIC, OR MOOD DISTURBANCE OR ANXIETY (HCC): ICD-10-CM

## 2024-10-28 DIAGNOSIS — R26.9 GAIT ABNORMALITY: Primary | ICD-10-CM

## 2024-10-28 DIAGNOSIS — R53.1 WEAKNESS: ICD-10-CM

## 2024-10-28 DIAGNOSIS — F02.80 ALZHEIMER'S DEMENTIA OF OTHER ONSET, UNSPECIFIED DEMENTIA SEVERITY, UNSPECIFIED WHETHER BEHAVIORAL, PSYCHOTIC, OR MOOD DISTURBANCE OR ANXIETY (HCC): ICD-10-CM

## 2024-10-28 NOTE — TELEPHONE ENCOUNTER
Yoonmee, Pallaitive Nurse Practitioner at [facility] is calling to advise the patient's daughter is requesting home health services for physical therapy .    Also advising the provider that  [employer] is leaving the company , the patient will need another palliative nurse with another company.    Will the provider please send orders  for the physical therapy services to       [phone]

## 2024-11-20 ENCOUNTER — TELEPHONE (OUTPATIENT)
Dept: NEUROLOGY | Facility: CLINIC | Age: 89
End: 2024-11-20

## 2024-11-20 NOTE — TELEPHONE ENCOUNTER
Approved    Prior authorization approved  Payer: [employer] [facility] Case ID: [id number]    [phone]    [phone]  Approval Details    Authorized from January 1, 2024 to December 31, 2024

## 2024-11-22 ENCOUNTER — TELEPHONE (OUTPATIENT)
Dept: FAMILY MEDICINE CLINIC | Facility: CLINIC | Age: 89
End: 2024-11-22

## 2024-11-22 NOTE — TELEPHONE ENCOUNTER
Rony from [facility] calling to state completed patient's physical therapy evaluation, would like to add occupational therapy to work on ADLs, will continue to work with patient, asking for call back at [phone] to approve order.

## 2024-11-23 NOTE — TELEPHONE ENCOUNTER
Called Rony with [facility], informed him of verbal order from Dr. Weiler, okay to add occupational therapy.     Rony verbalized understanding and will add this to the patient's treatment plan.

## 2024-12-11 ENCOUNTER — TELEPHONE (OUTPATIENT)
Dept: FAMILY MEDICINE CLINIC | Facility: CLINIC | Age: 89
End: 2024-12-11

## 2024-12-31 NOTE — TELEPHONE ENCOUNTER
Dr Sushma Moore, please see message below and advise.
Dr. Fountain Files: Please see message from patient daughter below.
Dr. Titus Sever daughter (POA) will need to contact her mother's insurance to find out what her benefits are regarding care in the home for bathing, dressing, feeding, etc. 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
LMTCB
Lulu Adhikari- Can you ask Priyank Cooper or triage how to go about this? ?  I don't know if we have case management.   Her daughter is Blanca Moseleymonika
Order signed
Patient's daughter calling in because her mother has Alzheimer's and she states it is progressing to the point where her mother is no longer able to do things on her own.  She is hoping to speak with someone to get information on how to get her mom a case m
Patient's daughter is calling back with name of the company she would like to use. Daughter is requesting home health orders.      [address]   [address] [facility], [address] [place] [address]    Phone -[phone]  Fax - [phone]
Patient's daughter is calling back.
Patient's daughter is requesting a call back.
Patient's daughter, Laureano Wan on JOSE, called for update. Reviewed note below about calling patient's insurance. She verbalized understanding. She was also informed to ask insurance of home health agencies that can assist with care.  Daughter will call back o
Patient's daughter, Una Gale, is calling to follow up and provided 3 names of agencies:    [facility] San  Ph# [phone]    [facility]ann Francisco   Ph# Ayleen Irving   Ph# [phone]    Daughter states she was advised by Salvador as well that patient can be seen in
Patients daughter calling to follow up, states company has not reached out to her.  It should be [facility]
Please see request for [facility] below. I have pended an order. I believe once the patient is established with [facility] they should be able to help connect her with resources available in the community.      Please route back to staff when signed so we may fax the ord
Received a call from [facility] who reports the nurse that called her wanted to know if anyone from [address] [place] [address] has reached out to her. Surekha reports no one from [address] [place] [address] has reached out to her as of now.  Message routed to the clinic si
Referral faxed through [employer]fax to fax listed below. Daughter advised.
Surekha waiting to see if referral has been faxed to [facility]. She was advised referral was faxed.
Yes please. That would be great, thanks. The daughters are very nice.
I have reviewed the labs, imaging and ekg. EKG with NSR HR 61 PACs, QTc 442 non-specific findings, CXR w/o focal consolidations

## 2025-01-02 ENCOUNTER — TELEPHONE (OUTPATIENT)
Dept: FAMILY MEDICINE CLINIC | Facility: CLINIC | Age: OVER 89
End: 2025-01-02

## 2025-01-02 NOTE — TELEPHONE ENCOUNTER
Rony from [facility] calling to request verbal order to re certify for home health physical therapy, can be reached at [phone].

## 2025-01-20 ENCOUNTER — MED REC SCAN ONLY (OUTPATIENT)
Dept: FAMILY MEDICINE CLINIC | Facility: CLINIC | Age: OVER 89
End: 2025-01-20

## 2025-01-27 ENCOUNTER — NURSE TRIAGE (OUTPATIENT)
Dept: FAMILY MEDICINE CLINIC | Facility: CLINIC | Age: OVER 89
End: 2025-01-27

## 2025-01-27 NOTE — TELEPHONE ENCOUNTER
Action Requested: Summary for Provider     []  Critical Lab, Recommendations Needed  [] Need Additional Advice  [x]   FYI    []   Need Orders  [] Need Medications Sent to Pharmacy  []  Other     SUMMARY: Disposition per protocol  is to home care      Reason for call: Fall  Onset: Yesterday    Patient's daughter Surekha calling, verified patient name  and date of birth. No verbal release on file. She found patient sitting on  the floor  at home yesterday,  and presumes she  fell  when she was  walking.   She doesn't like to use her walker or cane.   Patient has dementia, but she denies pain or injury.   No evidence of injury seen  by caller and patient's behavior  is at her baseline.   No pain with walking, interacting with family and  has a good  appetite. Reviewed care advice including supervise walking if possible, encourage use of walker or cane by keeping them within reach of patient, call back if any pain or signs of injury develop.  Surekha verbalizes understanding and agrees to plan of care.    Reason for Disposition   Recent fall and no injury    Protocols used: Falls and [id number]

## 2025-03-24 ENCOUNTER — TELEPHONE (OUTPATIENT)
Dept: FAMILY MEDICINE CLINIC | Facility: CLINIC | Age: OVER 89
End: 2025-03-24

## 2025-03-24 NOTE — TELEPHONE ENCOUNTER
Patient's daughter Surekha calling to ask if patient should still be taking metoprolol, asking for call back.     Current Outpatient Medications   Medication Sig Dispense Refill    metoprolol succinate ER 50 MG Oral Tablet 24 Hr Take 1 tablet (50 mg total) by mouth daily. 90 tablet 3

## 2025-03-24 NOTE — TELEPHONE ENCOUNTER
Verified name and  of patient.    Daughter of patient (on release of information) is unsure if Metoprolol was discontinued by patient's cardiologist, Dr. Tushar Oliveira.    She was advised to reach out to Dr. Oliveira's office to confirm if patient is to continue the medication because although Dr. Weiler prescribed it, the cardiologist is the specialist and may have a reason why he discontinued it.    She verbalized understanding and states she will reach out to Dr. Oliveira's office to confirm.

## (undated) DIAGNOSIS — R94.4 DECREASED GFR: Primary | ICD-10-CM